# Patient Record
Sex: MALE | Race: BLACK OR AFRICAN AMERICAN
[De-identification: names, ages, dates, MRNs, and addresses within clinical notes are randomized per-mention and may not be internally consistent; named-entity substitution may affect disease eponyms.]

---

## 2017-02-21 ENCOUNTER — HOSPITAL ENCOUNTER (OUTPATIENT)
Dept: HOSPITAL 62 - END | Age: 77
Discharge: HOME | End: 2017-02-21
Attending: INTERNAL MEDICINE
Payer: MEDICARE

## 2017-02-21 VITALS — DIASTOLIC BLOOD PRESSURE: 51 MMHG | SYSTOLIC BLOOD PRESSURE: 134 MMHG

## 2017-02-21 DIAGNOSIS — Z79.899: ICD-10-CM

## 2017-02-21 DIAGNOSIS — R63.0: ICD-10-CM

## 2017-02-21 DIAGNOSIS — K59.01: ICD-10-CM

## 2017-02-21 DIAGNOSIS — D50.0: Primary | ICD-10-CM

## 2017-02-21 DIAGNOSIS — D64.9: ICD-10-CM

## 2017-02-21 DIAGNOSIS — K29.50: ICD-10-CM

## 2017-02-21 DIAGNOSIS — K92.1: ICD-10-CM

## 2017-02-21 DIAGNOSIS — J44.9: ICD-10-CM

## 2017-02-21 DIAGNOSIS — D12.5: ICD-10-CM

## 2017-02-21 DIAGNOSIS — Z99.3: ICD-10-CM

## 2017-02-21 DIAGNOSIS — D12.3: ICD-10-CM

## 2017-02-21 DIAGNOSIS — E78.00: ICD-10-CM

## 2017-02-21 DIAGNOSIS — R63.4: ICD-10-CM

## 2017-02-21 DIAGNOSIS — Z99.81: ICD-10-CM

## 2017-02-21 DIAGNOSIS — Z79.51: ICD-10-CM

## 2017-02-21 DIAGNOSIS — I10: ICD-10-CM

## 2017-02-21 DIAGNOSIS — D12.0: ICD-10-CM

## 2017-02-21 PROCEDURE — 0DBH8ZX EXCISION OF CECUM, VIA NATURAL OR ARTIFICIAL OPENING ENDOSCOPIC, DIAGNOSTIC: ICD-10-PCS | Performed by: INTERNAL MEDICINE

## 2017-02-21 PROCEDURE — 88304 TISSUE EXAM BY PATHOLOGIST: CPT

## 2017-02-21 PROCEDURE — 88342 IMHCHEM/IMCYTCHM 1ST ANTB: CPT

## 2017-02-21 PROCEDURE — 43239 EGD BIOPSY SINGLE/MULTIPLE: CPT

## 2017-02-21 PROCEDURE — 0DB68ZX EXCISION OF STOMACH, VIA NATURAL OR ARTIFICIAL OPENING ENDOSCOPIC, DIAGNOSTIC: ICD-10-PCS | Performed by: INTERNAL MEDICINE

## 2017-02-21 PROCEDURE — 45385 COLONOSCOPY W/LESION REMOVAL: CPT

## 2017-02-21 PROCEDURE — 0DBL8ZX EXCISION OF TRANSVERSE COLON, VIA NATURAL OR ARTIFICIAL OPENING ENDOSCOPIC, DIAGNOSTIC: ICD-10-PCS | Performed by: INTERNAL MEDICINE

## 2017-02-21 PROCEDURE — 0DBN8ZX EXCISION OF SIGMOID COLON, VIA NATURAL OR ARTIFICIAL OPENING ENDOSCOPIC, DIAGNOSTIC: ICD-10-PCS | Performed by: INTERNAL MEDICINE

## 2017-02-21 RX ADMIN — MIDAZOLAM HYDROCHLORIDE ONE MG: 1 INJECTION, SOLUTION INTRAMUSCULAR; INTRAVENOUS at 17:24

## 2017-02-21 RX ADMIN — MIDAZOLAM HYDROCHLORIDE ONE MG: 1 INJECTION, SOLUTION INTRAMUSCULAR; INTRAVENOUS at 17:40

## 2017-02-21 NOTE — PDOC DISCHARGE SUMMARY
Discharge Summary (SDC)





- Discharge


Final Diagnosis: 


Gastritis and multiple colon polyps


Date of Surgery: 02/21/17


Condition: Stable


Treatment or Instructions: 


No new medications


Discharge Diet: As Tolerated


Discharge Activity: Activity As Tolerated


Report the Following to Your Physician Immediately: Vomiting, Redness, Swelling

, Warmth

## 2017-02-21 NOTE — OPERATIVE REPORT
Operative Report


DATE OF SURGERY: 02/21/17


Operative Report: 


Pre-op diagnosis: Anemia





Post-op diagnosis:


1.  Antral gastritis


2.  Polyps in the cecum, transverse, and the sigmoid colon





Surgery: Upper endoscopy with biopsy , Colonoscopy with polypectomy 





Medications: Versed 2mg,   Fentanyl  100mcg IV push





Tissue removed: Antral biopsy and multiple colon polyps





Procedure: After informed consent obtained from patient, patient's pharynx was 

sprayed with Hurricane and conscious sedation was achieved.  The upper 

endoscope was then inserted into the esophagus under direct vision and advanced 

into the stomach and further into the duodenum.  Detailed examination of the 

duodenum, stomach and the esophagus was then performed. 


 A digital rectal examination was performed and this was unremarkable.  The 

colonoscope was inserted into the rectum and advanced to the cecum.  The 

appendiceal orifice and the terminal ileum were both identified.  The mucosa 

was examined into details as the colonoscope was slowly pulled out of the 

patient.  The endoscope was retroflexed in the rectum. Patient tolerated the 

procedure well.





Findings





Esophagus: Normal


Stomach: Mild to moderate erythema in the gastric antrum


Duodenum: Normal


Cecum: Two 4 mm polyps removed with the cold snare


Ascending colon: Normal


Transverse colon: 5 mm polyp removed with the cold snare


Descending colon: Normal


Sigmoid colon: Four 6-11 mm polyps removed with a hot snare


Rectum: Normal except for internal hemorrhoids





Plan: Await pathology.  Continue Nexium.  Repeat colonoscopy in three years


OPERATION: .

## 2017-02-28 ENCOUNTER — HOSPITAL ENCOUNTER (INPATIENT)
Dept: HOSPITAL 62 - ER | Age: 77
LOS: 4 days | Discharge: HOME | DRG: 394 | End: 2017-03-04
Attending: INTERNAL MEDICINE | Admitting: INTERNAL MEDICINE
Payer: MEDICARE

## 2017-02-28 DIAGNOSIS — M19.90: ICD-10-CM

## 2017-02-28 DIAGNOSIS — I48.2: ICD-10-CM

## 2017-02-28 DIAGNOSIS — D50.9: ICD-10-CM

## 2017-02-28 DIAGNOSIS — Z79.01: ICD-10-CM

## 2017-02-28 DIAGNOSIS — K44.9: ICD-10-CM

## 2017-02-28 DIAGNOSIS — I10: ICD-10-CM

## 2017-02-28 DIAGNOSIS — Z86.010: ICD-10-CM

## 2017-02-28 DIAGNOSIS — E46: ICD-10-CM

## 2017-02-28 DIAGNOSIS — I25.10: ICD-10-CM

## 2017-02-28 DIAGNOSIS — Z95.0: ICD-10-CM

## 2017-02-28 DIAGNOSIS — J44.9: ICD-10-CM

## 2017-02-28 DIAGNOSIS — E78.00: ICD-10-CM

## 2017-02-28 DIAGNOSIS — Z79.899: ICD-10-CM

## 2017-02-28 DIAGNOSIS — E86.1: ICD-10-CM

## 2017-02-28 DIAGNOSIS — K92.2: ICD-10-CM

## 2017-02-28 DIAGNOSIS — K63.3: Primary | ICD-10-CM

## 2017-02-28 DIAGNOSIS — Z87.891: ICD-10-CM

## 2017-02-28 LAB
ALBUMIN SERPL-MCNC: 3.1 G/DL (ref 3.5–5)
ALP SERPL-CCNC: 54 U/L (ref 38–126)
ALT SERPL-CCNC: 21 U/L (ref 21–72)
AMYLASE SERPL-CCNC: 89 U/L (ref 30–110)
ANION GAP SERPL CALC-SCNC: 8 MMOL/L (ref 5–19)
AST SERPL-CCNC: 12 U/L (ref 17–59)
BASOPHILS # BLD AUTO: 0 10^3/UL (ref 0–0.2)
BASOPHILS NFR BLD AUTO: 0.4 % (ref 0–2)
BILIRUB DIRECT SERPL-MCNC: 0 MG/DL (ref 0–0.3)
BILIRUB SERPL-MCNC: 0.3 MG/DL (ref 0.2–1.3)
BUN SERPL-MCNC: 25 MG/DL (ref 7–20)
CALCIUM: 9.5 MG/DL (ref 8.4–10.2)
CHLORIDE SERPL-SCNC: 100 MMOL/L (ref 98–107)
CK MB SERPL-MCNC: < 0.22 NG/ML (ref ?–4.55)
CO2 SERPL-SCNC: 31 MMOL/L (ref 22–30)
CREAT SERPL-MCNC: 0.79 MG/DL (ref 0.52–1.25)
EOSINOPHIL # BLD AUTO: 0 10^3/UL (ref 0–0.6)
EOSINOPHIL NFR BLD AUTO: 0.4 % (ref 0–6)
ERYTHROCYTE [DISTWIDTH] IN BLOOD BY AUTOMATED COUNT: 21.9 % (ref 11.5–14)
ETHANOL SERPL-MCNC: < 10 MG/DL
GLUCOSE SERPL-MCNC: 104 MG/DL (ref 75–110)
HCT VFR BLD CALC: 23.5 % (ref 37.9–51)
HGB BLD-MCNC: 7.6 G/DL (ref 13.5–17)
HGB HCT DIFFERENCE: -0.7
LIPASE SERPL-CCNC: 73.9 U/L (ref 23–300)
LYMPHOCYTES # BLD AUTO: 0.7 10^3/UL (ref 0.5–4.7)
LYMPHOCYTES NFR BLD AUTO: 11.6 % (ref 13–45)
MAGNESIUM SERPL-MCNC: 1.6 MG/DL (ref 1.6–2.3)
MCH RBC QN AUTO: 26.7 PG (ref 27–33.4)
MCHC RBC AUTO-ENTMCNC: 32.5 G/DL (ref 32–36)
MCV RBC AUTO: 82 FL (ref 80–97)
MONOCYTES # BLD AUTO: 0.5 10^3/UL (ref 0.1–1.4)
MONOCYTES NFR BLD AUTO: 7.3 % (ref 3–13)
NEUTROPHILS # BLD AUTO: 5.1 10^3/UL (ref 1.7–8.2)
NEUTS SEG NFR BLD AUTO: 80.3 % (ref 42–78)
PHOSPHATE SERPL-MCNC: 2.9 MG/DL (ref 2.5–4.5)
POTASSIUM SERPL-SCNC: 4.2 MMOL/L (ref 3.6–5)
PROT SERPL-MCNC: 5.3 G/DL (ref 6.3–8.2)
PROTHROMBIN TIME: 15.8 SEC (ref 11.4–15.4)
RBC # BLD AUTO: 2.86 10^6/UL (ref 4.35–5.55)
SODIUM SERPL-SCNC: 139.1 MMOL/L (ref 137–145)
TROPONIN I SERPL-MCNC: < 0.012 NG/ML
TSH SERPL-ACNC: 1.74 UIU/ML (ref 0.47–4.68)
WBC # BLD AUTO: 6.3 10^3/UL (ref 4–10.5)

## 2017-02-28 PROCEDURE — 85730 THROMBOPLASTIN TIME PARTIAL: CPT

## 2017-02-28 PROCEDURE — 86920 COMPATIBILITY TEST SPIN: CPT

## 2017-02-28 PROCEDURE — 93010 ELECTROCARDIOGRAM REPORT: CPT

## 2017-02-28 PROCEDURE — 84439 ASSAY OF FREE THYROXINE: CPT

## 2017-02-28 PROCEDURE — 85025 COMPLETE CBC W/AUTO DIFF WBC: CPT

## 2017-02-28 PROCEDURE — 36430 TRANSFUSION BLD/BLD COMPNT: CPT

## 2017-02-28 PROCEDURE — 86900 BLOOD TYPING SEROLOGIC ABO: CPT

## 2017-02-28 PROCEDURE — 86901 BLOOD TYPING SEROLOGIC RH(D): CPT

## 2017-02-28 PROCEDURE — 30233N1 TRANSFUSION OF NONAUTOLOGOUS RED BLOOD CELLS INTO PERIPHERAL VEIN, PERCUTANEOUS APPROACH: ICD-10-PCS | Performed by: INTERNAL MEDICINE

## 2017-02-28 PROCEDURE — 80061 LIPID PANEL: CPT

## 2017-02-28 PROCEDURE — 83735 ASSAY OF MAGNESIUM: CPT

## 2017-02-28 PROCEDURE — 83880 ASSAY OF NATRIURETIC PEPTIDE: CPT

## 2017-02-28 PROCEDURE — 85610 PROTHROMBIN TIME: CPT

## 2017-02-28 PROCEDURE — 87186 SC STD MICRODIL/AGAR DIL: CPT

## 2017-02-28 PROCEDURE — 82150 ASSAY OF AMYLASE: CPT

## 2017-02-28 PROCEDURE — 80053 COMPREHEN METABOLIC PANEL: CPT

## 2017-02-28 PROCEDURE — 86850 RBC ANTIBODY SCREEN: CPT

## 2017-02-28 PROCEDURE — 82550 ASSAY OF CK (CPK): CPT

## 2017-02-28 PROCEDURE — 80048 BASIC METABOLIC PNL TOTAL CA: CPT

## 2017-02-28 PROCEDURE — 83036 HEMOGLOBIN GLYCOSYLATED A1C: CPT

## 2017-02-28 PROCEDURE — 45382 COLONOSCOPY W/CONTROL BLEED: CPT

## 2017-02-28 PROCEDURE — 83690 ASSAY OF LIPASE: CPT

## 2017-02-28 PROCEDURE — 82553 CREATINE MB FRACTION: CPT

## 2017-02-28 PROCEDURE — 96360 HYDRATION IV INFUSION INIT: CPT

## 2017-02-28 PROCEDURE — 80307 DRUG TEST PRSMV CHEM ANLYZR: CPT

## 2017-02-28 PROCEDURE — 87040 BLOOD CULTURE FOR BACTERIA: CPT

## 2017-02-28 PROCEDURE — 85027 COMPLETE CBC AUTOMATED: CPT

## 2017-02-28 PROCEDURE — 36415 COLL VENOUS BLD VENIPUNCTURE: CPT

## 2017-02-28 PROCEDURE — 84443 ASSAY THYROID STIM HORMONE: CPT

## 2017-02-28 PROCEDURE — 84484 ASSAY OF TROPONIN QUANT: CPT

## 2017-02-28 PROCEDURE — 80076 HEPATIC FUNCTION PANEL: CPT

## 2017-02-28 PROCEDURE — P9016 RBC LEUKOCYTES REDUCED: HCPCS

## 2017-02-28 PROCEDURE — 99291 CRITICAL CARE FIRST HOUR: CPT

## 2017-02-28 PROCEDURE — 93005 ELECTROCARDIOGRAM TRACING: CPT

## 2017-02-28 PROCEDURE — 84100 ASSAY OF PHOSPHORUS: CPT

## 2017-02-28 PROCEDURE — 82140 ASSAY OF AMMONIA: CPT

## 2017-02-28 PROCEDURE — 45381 COLONOSCOPY SUBMUCOUS NJX: CPT

## 2017-02-28 PROCEDURE — 83605 ASSAY OF LACTIC ACID: CPT

## 2017-02-28 PROCEDURE — 87077 CULTURE AEROBIC IDENTIFY: CPT

## 2017-02-28 NOTE — ER DOCUMENT REPORT
ED GI Bleed / Rectal Pain





<DIANDRA MASSEY - Last Filed: 02/28/17 19:52>





- General


Mode of Arrival: Medic


Information source: Patient


TRAVEL OUTSIDE OF THE [place] IN LAST 30 DAYS: No





- HPI


Patient complains to provider of: Dark red bld from rectum


Onset: Yesterday


Timing/Duration: Sudden, Persistent


Recently seen / treated by doctor: Yes - colonoscopy 02/21/2017





<DEYA WILLIAM - Last Filed: 02/28/17 20:35>





- General


Chief Complaint: Rectal Bleeding


Stated Complaint: RECTAL BLEEDING


Notes: 


Patient is a 76-year-old male presenting to the emergency department concerned 

of rectal bleeding onset last night.  Patient thought he was having a bowel 

movement, but instead blood came out.  Patient describes it as very dark red.  

Patient denies any lightheadedness, but states that he becomes short of breath 

with walking.  Last Tuesday patient had a colonoscopy.  Polyps as well as 

gastritis were noted.  He received fluid bolus en route.  Patient was also 

continued on a blood thinner.


 (DEYA WILLIAM)





- Related Data


Allergies/Adverse Reactions: 


 





No Known Allergies Allergy (Verified 02/21/17 15:21)


 











Past Medical History





- General


Information source: Patient





- Social History


Smoking Status: Unknown if Ever Smoked


Family History: Reviewed & Not Pertinent





- Past Medical History


Cardiac Medical History: Reports: Hx Atrial Fibrillation - paroxysmal, Hx 

Coronary Artery Disease, Hx Hypercholesterolemia, Hx Hypertension


   Denies: Hx Heart Attack


Pulmonary Medical History: Reports: Hx Asthma, Hx Bronchitis, Hx COPD, Hx 

Pneumonia - june 2012, Hx Respiratory Failure


   Denies: Hx Tuberculosis


Neurological Medical History: Denies: Hx Cerebrovascular Accident, Hx Seizures


Renal/ Medical History: Reports: Hx Benign Prostatic Hyperplasia, Hx Kidney 

Stones


GI Medical History: Reports: Hx Hiatal Hernia


Musculoskeltal Medical History: Reports Hx Arthritis


Past Surgical History: Reports: Hx Pacemaker





- Immunizations


Hx Diphtheria, Pertussis, Tetanus Vaccination: Yes


Hx Pneumococcal Vaccination: 01/01/11





<DYEA WILLIAM - Last Filed: 02/28/17 20:35>





Review of Systems





- Review of Systems


Constitutional: No symptoms reported


EENT: No symptoms reported


Cardiovascular: No symptoms reported.  denies: Lightheaded


Respiratory: See HPI, Short of breath - With walking


Gastrointestinal: No symptoms reported


Genitourinary: No symptoms reported


Male Genitourinary: No symptoms reported


Musculoskeletal: No symptoms reported


Skin: No symptoms reported


Hematologic/Lymphatic: No symptoms reported


Neurological/Psychological: No symptoms reported


-: Yes All other systems reviewed and negative





<DEYA WILLIAM - Last Filed: 02/28/17 20:35>





Physical Exam





- Vital signs


Interpretation: Hypotensive, Tachycardic





- General


General appearance: Alert





- HEENT


Head: Normocephalic, Atraumatic


Eyes: Normal


Pupils: PERRL





- Respiratory


Respiratory status: No respiratory distress


Chest status: Nontender


Breath sounds: Normal


Chest palpation: Normal





- Cardiovascular


Rhythm: Regular


Heart sounds: Normal auscultation


Murmur: No





- Abdominal


Inspection: Normal


Distension: No distension


Bowel sounds: Normal


Tenderness: Nontender


Organomegaly: No organomegaly





- Rectal


Stool: Heme positive, Bloody


Hemorrhoids: External





- Back


Back: Normal, Nontender





- Extremities


General upper extremity: Normal inspection


General lower extremity: Normal inspection





- Neurological


Neuro grossly intact: Yes


Cognition: Normal


Orientation: AAOx4


[place] Coma Scale Eye Opening: Spontaneous


[place] Coma Scale Verbal: Oriented


[place] Coma Scale Motor: Obeys Commands


Ash[place] Coma Scale Total: 15


Speech: Normal





- Psychological


Associated symptoms: Normal affect, Normal mood





- Skin


Skin Temperature: Warm


Skin Moisture: Dry


Skin Color: Normal





<NATALIIADEYA - Last Filed: 02/28/17 20:35>





- Vital signs


Vitals: 


 











Resp Pulse Ox


 


 15   100 


 


 02/28/17 18:35  02/28/17 18:35














Course





- Laboratory


Result Diagrams: 


 02/28/17 19:02





 02/28/17 18:40





<DIANDRA MASSEY - Last Filed: 02/28/17 19:52>





- Laboratory


Result Diagrams: 


 02/28/17 19:02





 02/28/17 18:40





- Consults


  ** Dr. Vazquez


Time consulted: 19:38





  ** Dr. Longo


Time consulted: 19:50


Consulted provider: will see as inpatient





<DONNIE WILLIAMICA - Last Filed: 02/28/17 20:35>





- Vital Signs


Vital signs: 


 











Temp Pulse Resp BP Pulse Ox


 


       18   139/78 H  100 


 


       02/28/17 20:01  02/28/17 20:01  02/28/17 20:01














- Laboratory


Laboratory results interpreted by me: 


 











  02/28/17 02/28/17 02/28/17





  18:35 18:40 18:40


 


RBC   


 


Hgb   


 


Hct   


 


MCH   


 


RDW   


 


Seg Neutrophils %   


 


Lymphocytes %   


 


PT    15.8 H


 


Carbon Dioxide   31 H 


 


BUN   25 H 


 


AST   12 L 


 


Total Protein   5.3 L 


 


Albumin   3.1 L 


 


Acetaminophen   < 10 L 


 


Crossmatch  See Detail  














  02/28/17





  19:02


 


RBC  2.86 L


 


Hgb  7.6 L


 


Hct  23.5 L


 


MCH  26.7 L


 


RDW  21.9 H


 


Seg Neutrophils %  80.3 H


 


Lymphocytes %  11.6 L


 


PT 


 


Carbon Dioxide 


 


BUN 


 


AST 


 


Total Protein 


 


Albumin 


 


Acetaminophen 


 


Crossmatch 














- Consults


  ** Dr. Vazquez


Reason for consultation: 


02/28/17 19:38 Dr. Vazquez paged, awaiting return phone call. 








02/28/17 19:41 Dr. Vazquez returned call.  Discussed patient's case with him.

  Informed him that the patient is actively bleeding. I will check to see if GI 

is on call because the  stated that there was no GI doctor on call.


 (DEYA WILLIAM)





  ** Dr. Longo


Reason for consultation: 


02/28/17 20:23 discussed patient's case with Dr. Longo who wants an NG tube and 

go lightly.  Dr. Longo agrees to accept the patient.








02/28/17 20:35


 (DEYA WILLIAM)





Critical Care Note





- Critical Care Note


Total time excluding time spent on procedures (mins): 60





<DIANDRA MASSEY - Last Filed: 02/28/17 19:52>





Discharge





- Discharge


Admitting Provider: George


Unit Admitted: ICU





<DIANDRA MASSEY - Last Filed: 02/28/17 19:52>





<DEYA WILLIAM - Last Filed: 02/28/17 20:35>





- Discharge


Clinical Impression: 


 Acute lower GI bleeding, acute blood loss anemia with transfusion








Scribe Documentation





- Scribe


Written by Scribe:: Deya William 02/28/2017 0732


acting as scribe for :: [facility]





<DEYA WILLIAM - Last Filed: 02/28/17 20:35>

## 2017-03-01 LAB
ALBUMIN SERPL-MCNC: 3.1 G/DL (ref 3.5–5)
ALP SERPL-CCNC: 53 U/L (ref 38–126)
ALT SERPL-CCNC: 22 U/L (ref 21–72)
ANION GAP SERPL CALC-SCNC: 8 MMOL/L (ref 5–19)
AST SERPL-CCNC: 14 U/L (ref 17–59)
BASOPHILS # BLD AUTO: 0 10^3/UL (ref 0–0.2)
BASOPHILS # BLD AUTO: 0 10^3/UL (ref 0–0.2)
BASOPHILS NFR BLD AUTO: 0.3 % (ref 0–2)
BASOPHILS NFR BLD AUTO: 0.3 % (ref 0–2)
BILIRUB DIRECT SERPL-MCNC: 0 MG/DL (ref 0–0.3)
BILIRUB SERPL-MCNC: 0.6 MG/DL (ref 0.2–1.3)
BUN SERPL-MCNC: 21 MG/DL (ref 7–20)
CALCIUM: 9.1 MG/DL (ref 8.4–10.2)
CHLORIDE SERPL-SCNC: 104 MMOL/L (ref 98–107)
CHOLEST SERPL-MCNC: 120.02 MG/DL (ref 0–200)
CO2 SERPL-SCNC: 29 MMOL/L (ref 22–30)
CREAT SERPL-MCNC: 0.68 MG/DL (ref 0.52–1.25)
DIRECT HDL: 68 MG/DL (ref 40–?)
EOSINOPHIL # BLD AUTO: 0 10^3/UL (ref 0–0.6)
EOSINOPHIL # BLD AUTO: 0 10^3/UL (ref 0–0.6)
EOSINOPHIL NFR BLD AUTO: 0.2 % (ref 0–6)
EOSINOPHIL NFR BLD AUTO: 0.2 % (ref 0–6)
ERYTHROCYTE [DISTWIDTH] IN BLOOD BY AUTOMATED COUNT: 22 % (ref 11.5–14)
ERYTHROCYTE [DISTWIDTH] IN BLOOD BY AUTOMATED COUNT: 22.3 % (ref 11.5–14)
ERYTHROCYTE [DISTWIDTH] IN BLOOD BY AUTOMATED COUNT: 22.7 % (ref 11.5–14)
GLUCOSE SERPL-MCNC: 108 MG/DL (ref 75–110)
HCT VFR BLD CALC: 26.8 % (ref 37.9–51)
HCT VFR BLD CALC: 27.1 % (ref 37.9–51)
HCT VFR BLD CALC: 27.5 % (ref 37.9–51)
HGB BLD-MCNC: 8.9 G/DL (ref 13.5–17)
HGB BLD-MCNC: 9 G/DL (ref 13.5–17)
HGB BLD-MCNC: 9.1 G/DL (ref 13.5–17)
HGB HCT DIFFERENCE: -0.1
HGB HCT DIFFERENCE: -0.1
HGB HCT DIFFERENCE: -0.2
LDLC SERPL DIRECT ASSAY-MCNC: < 30 MG/DL (ref ?–100)
LYMPHOCYTES # BLD AUTO: 1.1 10^3/UL (ref 0.5–4.7)
LYMPHOCYTES # BLD AUTO: 1.2 10^3/UL (ref 0.5–4.7)
LYMPHOCYTES NFR BLD AUTO: 11.3 % (ref 13–45)
LYMPHOCYTES NFR BLD AUTO: 14.2 % (ref 13–45)
MCH RBC QN AUTO: 27.3 PG (ref 27–33.4)
MCH RBC QN AUTO: 27.4 PG (ref 27–33.4)
MCH RBC QN AUTO: 27.6 PG (ref 27–33.4)
MCHC RBC AUTO-ENTMCNC: 33 G/DL (ref 32–36)
MCHC RBC AUTO-ENTMCNC: 33.1 G/DL (ref 32–36)
MCHC RBC AUTO-ENTMCNC: 33.4 G/DL (ref 32–36)
MCV RBC AUTO: 82 FL (ref 80–97)
MCV RBC AUTO: 83 FL (ref 80–97)
MCV RBC AUTO: 84 FL (ref 80–97)
MONOCYTES # BLD AUTO: 0.5 10^3/UL (ref 0.1–1.4)
MONOCYTES # BLD AUTO: 0.7 10^3/UL (ref 0.1–1.4)
MONOCYTES NFR BLD AUTO: 6.1 % (ref 3–13)
MONOCYTES NFR BLD AUTO: 6.9 % (ref 3–13)
NEUTROPHILS # BLD AUTO: 6.7 10^3/UL (ref 1.7–8.2)
NEUTROPHILS # BLD AUTO: 8.2 10^3/UL (ref 1.7–8.2)
NEUTS SEG NFR BLD AUTO: 79.2 % (ref 42–78)
NEUTS SEG NFR BLD AUTO: 81.3 % (ref 42–78)
POTASSIUM SERPL-SCNC: 4.3 MMOL/L (ref 3.6–5)
PROT SERPL-MCNC: 5.2 G/DL (ref 6.3–8.2)
RBC # BLD AUTO: 3.25 10^6/UL (ref 4.35–5.55)
RBC # BLD AUTO: 3.29 10^6/UL (ref 4.35–5.55)
RBC # BLD AUTO: 3.29 10^6/UL (ref 4.35–5.55)
SODIUM SERPL-SCNC: 140.7 MMOL/L (ref 137–145)
TRIGL SERPL-MCNC: 94 MG/DL (ref ?–150)
VLDLC SERPL CALC-MCNC: 19 MG/DL (ref 10–31)
WBC # BLD AUTO: 10.1 10^3/UL (ref 4–10.5)
WBC # BLD AUTO: 7 10^3/UL (ref 4–10.5)
WBC # BLD AUTO: 8.5 10^3/UL (ref 4–10.5)

## 2017-03-01 PROCEDURE — 0W3P8ZZ CONTROL BLEEDING IN GASTROINTESTINAL TRACT, VIA NATURAL OR ARTIFICIAL OPENING ENDOSCOPIC: ICD-10-PCS | Performed by: INTERNAL MEDICINE

## 2017-03-01 RX ADMIN — FLUTICASONE PROPIONATE AND SALMETEROL SCH INH: 50; 250 POWDER RESPIRATORY (INHALATION) at 18:19

## 2017-03-01 RX ADMIN — SIMVASTATIN SCH MG: 40 TABLET, FILM COATED ORAL at 18:19

## 2017-03-01 RX ADMIN — MESALAMINE SCH MG: 400 CAPSULE, DELAYED RELEASE ORAL at 13:12

## 2017-03-01 RX ADMIN — MESALAMINE SCH MG: 400 CAPSULE, DELAYED RELEASE ORAL at 18:19

## 2017-03-01 RX ADMIN — MONTELUKAST SODIUM SCH MG: 10 TABLET, FILM COATED ORAL at 18:20

## 2017-03-01 RX ADMIN — TAMSULOSIN HYDROCHLORIDE SCH MG: 0.4 CAPSULE ORAL at 18:19

## 2017-03-01 NOTE — PDOC H&P
History of Present Illness


Admission Date/PCP: 


  02/28/17 19:56





  MIKE MCCULLOUGH MD





History of Present Illness: 


PEDRO REYES is a 76 year old male, history of chronic atrial fibrillation on 

chronic anticoagulation,he had colonoscopy on 02/21/2017, he polyps removed 

from the colon He came to emergency room with rectal bleeding, when he 

presented he was hypotensive with hemoglobin 7, he was treated in the emergency 

room with normal saline and subsequently receive 2 units of packed red blood 

cells.  He was seen by GI physician, He had  Emergency Colonoscopy and was 

found to have ulcer in the sigmoid colon that was actively bleeding.  The ulcer 

base was injected with epinephrine,the bleeding is presently controlled.





Past Medical History


Cardiac Medical History: Reports: Atrial Fibrillation - paroxysmal, Coronary 

Artery Disease, Hyperlipidema, Hypertension


Pulmonary Medical History: Reports: Asthma, Bronchitis, Chronic Obstructive 

Pulmonary Disease (COPD), Pneumonia - june 2012, Respiratory Failure


GI Medical History: Reports: Hiatal Hernia


Musculoskeltal Medical History: Reports: Arthritis


Hematology: Reports: Anemia - chronic





Past Surgical History


Past Surgical History: Reports: Pacemaker





Social History


Information Source: Patient


Smoking Status: Former Smoker


Frequency of Alcohol Use: None


Hx Recreational Drug Use: No


Hx Prescription Drug Abuse: No





- Advance Directive


Resuscitation Status: Full Code





Family History


Family History: Reviewed & Not Pertinent


Parental Family History Reviewed: Yes


Children Family History Reviewed: Yes


Sibling(s) Family History Reviewed.: Yes





Medication/Allergy


Home Medications: 








Dabigatran Etexilate Mesylate [Pradaxa 150 mg Capsule] 150 mg PO BID 03/01/17 


Diltiazem HCl [Diltiazem ER] 180 mg PO DAILY 03/01/17 


Docusate Sodium [Colace 100 mg Capsule] 100 mg PO BID 03/01/17 


Esomeprazole Magnesium [Nexium] 40 mg PO DAILY 03/01/17 


Finasteride [Proscar 5 mg Tablet] 5 mg PO DAILY 03/01/17 


Fluticasone/Salmeterol [Advair 250-50 Diskus 28 dose] 1 puff IH Q12 03/01/17 


Hydrochlorothiazide [Hydrodiuril 25 mg Tablet] 25 mg PO DAILY 03/01/17 


Megestrol Acetate 40 mg PO DAILY 03/01/17 


Montelukast Sodium [Singulair 10 mg Tablet] 10 mg PO QPM 03/01/17 


Roflumilast [Daliresp 500 mcg Tablet] 500 mg PO DAILY 03/01/17 


Sertraline HCl [Zoloft 50 mg Tablet] 50 mg PO DAILY 03/01/17 


Simvastatin [Zocor 40 mg Tablet] 40 mg PO QPM 03/01/17 


Tamsulosin HCl [Flomax 0.4 mg Cap.sr] 2 cap PO QHS 03/01/17 


Tiotropium Bromide [Spiriva Handihaler 18 mcg/dose (30 Dose)] 1 cap IH DAILY 03/ 01/17 








Allergies/Adverse Reactions: 


 





No Known Allergies Allergy (Verified 02/21/17 15:21)


 











Review of Systems


Constitutional: PRESENT: weight loss


Eyes: ABSENT: visual disturbances


Ears: ABSENT: hearing changes


Cardiovascular: ABSENT: chest pain, dyspnea on exertion, edema, orthropnea, 

palpitations


Respiratory: ABSENT: cough, hemoptysis


Gastrointestinal: PRESENT: hematochezia


Genitourinary: ABSENT: dysuria, hematuria


Musculoskeletal: ABSENT: joint swelling


Integumentary: ABSENT: rash, wounds


Neurological: ABSENT: abnormal gait, abnormal speech, confusion, dizziness, 

focal weakness, syncope


Psychiatric: ABSENT: anxiety, depression, homidical ideation, suicidal ideation


Endocrine: ABSENT: cold intolerance, heat intolerance, menstrual abnormalities, 

polydipsia, polyuria


Hematologic/Lymphatic: ABSENT: easy bleeding, easy bruising, lymphadenopathy





Physical Exam


Vital Signs: 


 











Temp Pulse Resp BP Pulse Ox


 


 98.1 F   96   20   110/65   94 


 


 03/01/17 12:00  03/01/17 12:12  03/01/17 12:00  03/01/17 12:00  03/01/17 12:00








 Intake & Output











 02/28/17 03/01/17 03/02/17





 06:59 06:59 06:59


 


Intake Total  1600 


 


Balance  1600 


 


Weight  75 kg 











General appearance: PRESENT: thin


Head exam: PRESENT: atraumatic, normocephalic


Eye exam: PRESENT: conjunctiva pink, EOMI, PERRLA


Mouth exam: PRESENT: moist


Neck exam: PRESENT: full ROM


Cardiovascular exam: PRESENT: RRR, +S1, +S2


Vascular exam: PRESENT: normal capillary refill


GI/Abdominal exam: PRESENT: normal bowel sounds, soft


Rectal exam: PRESENT: deferred


Neurological exam: PRESENT: alert, awake, oriented to person, oriented to place

, oriented to time, oriented to situation, CN II-XII grossly intact


Psychiatric exam: PRESENT: appropriate affect, normal mood


Skin exam: PRESENT: dry, intact, warm





Results


Laboratory Results: 


 





 03/01/17 07:14 





 03/01/17 07:14 





 











  02/28/17 03/01/17 03/01/17





  20:30 00:37 07:14


 


WBC   8.5  10.1


 


RBC   3.29 L  3.25 L


 


Hgb   9.1 L  8.9 L


 


Hct   27.5 L  26.8 L


 


MCV   84  83


 


MCH   27.6  27.3


 


MCHC   33.0  33.1


 


RDW   22.0 H  22.3 H


 


Plt Count   183  172


 


Seg Neutrophils %   79.2 H  81.3 H


 


Lymphocytes %   14.2  11.3 L


 


Monocytes %   6.1  6.9


 


Eosinophils %   0.2  0.2


 


Basophils %   0.3  0.3


 


Absolute Neutrophils   6.7  8.2


 


Absolute Lymphocytes   1.2  1.1


 


Absolute Monocytes   0.5  0.7


 


Absolute Eosinophils   0.0  0.0


 


Absolute Basophils   0.0  0.0


 


Sodium   


 


Potassium   


 


Chloride   


 


Carbon Dioxide   


 


Anion Gap   


 


BUN   


 


Creatinine   


 


Est GFR ( Amer)   


 


Est GFR (Non-Af Amer)   


 


Glucose   


 


Lactic Acid  1.7  


 


Calcium   


 


Total Bilirubin   


 


AST   


 


ALT   


 


Alkaline Phosphatase   


 


Ammonia   


 


Total Protein   


 


Albumin   


 


Triglycerides   


 


Cholesterol   


 


LDL Cholesterol Direct   


 


VLDL Cholesterol   


 


HDL Cholesterol   














  03/01/17 03/01/17





  07:14 07:14


 


WBC  


 


RBC  


 


Hgb  


 


Hct  


 


MCV  


 


MCH  


 


MCHC  


 


RDW  


 


Plt Count  


 


Seg Neutrophils %  


 


Lymphocytes %  


 


Monocytes %  


 


Eosinophils %  


 


Basophils %  


 


Absolute Neutrophils  


 


Absolute Lymphocytes  


 


Absolute Monocytes  


 


Absolute Eosinophils  


 


Absolute Basophils  


 


Sodium  140.7 


 


Potassium  4.3 


 


Chloride  104 


 


Carbon Dioxide  29 


 


Anion Gap  8 


 


BUN  21 H 


 


Creatinine  0.68 


 


Est GFR ( Amer)  > 60 


 


Est GFR (Non-Af Amer)  > 60 


 


Glucose  108 


 


Lactic Acid  


 


Calcium  9.1 


 


Total Bilirubin  0.6 


 


AST  14 L 


 


ALT  22 


 


Alkaline Phosphatase  53 


 


Ammonia   8.9 L


 


Total Protein  5.2 L 


 


Albumin  3.1 L 


 


Triglycerides  94 


 


Cholesterol  120.02 


 


LDL Cholesterol Direct  < 30 


 


VLDL Cholesterol  19.0 


 


HDL Cholesterol  68 








 











  03/01/17 03/01/17 03/01/17





  00:37 00:37 07:14


 


Creatine Kinase  29 L   43 L


 


Troponin I   < 0.012 














  03/01/17 03/01/17 03/01/17





  07:14 12:39 12:39


 


Creatine Kinase   41 L 


 


Troponin I  < 0.012   < 0.012














Assessment & Plan





- Diagnosis


(1) Acute lower gastrointestinal bleeding


Is this a current diagnosis for this admission?: Yes





(2) Chronic atrial fibrillation


Is this a current diagnosis for this admission?: YesPlan: 


Patient is on chronic anticoagulation with pradaxa, will hold on pradaxa at the 

moment 








(3) Chronic obstructive pulmonary disease


Qualifiers: 


     COPD type: unspecified COPD        Qualified Code(s): J44.9 - Chronic 

obstructive pulmonary disease, unspecified  


Is this a current diagnosis for this admission?: Yes





(4) Hypovolemia due to hemorrhage


Is this a current diagnosis for this admission?: YesPlan: 


He presented with hypotension on due GI bleed, status post blood transfusion 

presently hemodynamically stable.  He will be admitted into ICU for close 

monitoring

## 2017-03-01 NOTE — PDOC PROGRESS REPORT
Subjective


Progress Note for:: 03/01/17


Subjective:: 


Patient was seen by the bedside, still have  rectal bleed, probably will have 

another colonoscopy .  He is presently being prep for another colonoscopy





Physical Exam


Vital Signs: 


 











Temp Pulse Resp BP Pulse Ox


 


 98.1 F   96   21 H  127/62 H  96 


 


 03/01/17 12:00  03/01/17 12:12  03/01/17 14:35  03/01/17 14:35  03/01/17 14:35








 Intake & Output











 02/28/17 03/01/17 03/02/17





 06:59 06:59 06:59


 


Intake Total  1600 


 


Balance  1600 


 


Weight  75 kg 











General appearance: PRESENT: no acute distress, well-developed, well-nourished


Head exam: PRESENT: atraumatic, normocephalic


Eye exam: PRESENT: conjunctiva pink, EOMI, PERRLA


Neck exam: PRESENT: full ROM


Respiratory exam: PRESENT: clear to auscultation rivera


Cardiovascular exam: PRESENT: RRR, +S1, +S2


GI/Abdominal exam: PRESENT: normal bowel sounds, soft


Rectal exam: PRESENT: deferred


Neurological exam: PRESENT: alert, awake, oriented to person, oriented to place

, oriented to time, oriented to situation, CN II-XII grossly intact


Psychiatric exam: PRESENT: appropriate affect, normal mood


Skin exam: PRESENT: dry, intact, warm





Results


Laboratory Results: 


 





 03/01/17 07:14 





 03/01/17 07:14 





 











  02/28/17 03/01/17 03/01/17





  20:30 00:37 07:14


 


WBC   8.5  10.1


 


RBC   3.29 L  3.25 L


 


Hgb   9.1 L  8.9 L


 


Hct   27.5 L  26.8 L


 


MCV   84  83


 


MCH   27.6  27.3


 


MCHC   33.0  33.1


 


RDW   22.0 H  22.3 H


 


Plt Count   183  172


 


Seg Neutrophils %   79.2 H  81.3 H


 


Lymphocytes %   14.2  11.3 L


 


Monocytes %   6.1  6.9


 


Eosinophils %   0.2  0.2


 


Basophils %   0.3  0.3


 


Absolute Neutrophils   6.7  8.2


 


Absolute Lymphocytes   1.2  1.1


 


Absolute Monocytes   0.5  0.7


 


Absolute Eosinophils   0.0  0.0


 


Absolute Basophils   0.0  0.0


 


Sodium   


 


Potassium   


 


Chloride   


 


Carbon Dioxide   


 


Anion Gap   


 


BUN   


 


Creatinine   


 


Est GFR ( Amer)   


 


Est GFR (Non-Af Amer)   


 


Glucose   


 


Lactic Acid  1.7  


 


Calcium   


 


Total Bilirubin   


 


AST   


 


ALT   


 


Alkaline Phosphatase   


 


Ammonia   


 


Total Protein   


 


Albumin   


 


Triglycerides   


 


Cholesterol   


 


LDL Cholesterol Direct   


 


VLDL Cholesterol   


 


HDL Cholesterol   














  03/01/17 03/01/17





  07:14 07:14


 


WBC  


 


RBC  


 


Hgb  


 


Hct  


 


MCV  


 


MCH  


 


MCHC  


 


RDW  


 


Plt Count  


 


Seg Neutrophils %  


 


Lymphocytes %  


 


Monocytes %  


 


Eosinophils %  


 


Basophils %  


 


Absolute Neutrophils  


 


Absolute Lymphocytes  


 


Absolute Monocytes  


 


Absolute Eosinophils  


 


Absolute Basophils  


 


Sodium  140.7 


 


Potassium  4.3 


 


Chloride  104 


 


Carbon Dioxide  29 


 


Anion Gap  8 


 


BUN  21 H 


 


Creatinine  0.68 


 


Est GFR ( Amer)  > 60 


 


Est GFR (Non-Af Amer)  > 60 


 


Glucose  108 


 


Lactic Acid  


 


Calcium  9.1 


 


Total Bilirubin  0.6 


 


AST  14 L 


 


ALT  22 


 


Alkaline Phosphatase  53 


 


Ammonia   8.9 L


 


Total Protein  5.2 L 


 


Albumin  3.1 L 


 


Triglycerides  94 


 


Cholesterol  120.02 


 


LDL Cholesterol Direct  < 30 


 


VLDL Cholesterol  19.0 


 


HDL Cholesterol  68 








 











  03/01/17 03/01/17 03/01/17





  00:37 00:37 07:14


 


Creatine Kinase  29 L   43 L


 


Troponin I   < 0.012 














  03/01/17 03/01/17 03/01/17





  07:14 12:39 12:39


 


Creatine Kinase   41 L 


 


Troponin I  < 0.012   < 0.012














Assessment & Plan





- Diagnosis


(1) Acute lower gastrointestinal bleeding


Is this a current diagnosis for this admission?: Yes





(2) Chronic atrial fibrillation


Is this a current diagnosis for this admission?: Yes





(3) Chronic obstructive pulmonary disease


Qualifiers: 


     COPD type: unspecified COPD        Qualified Code(s): J44.9 - Chronic 

obstructive pulmonary disease, unspecified  


Is this a current diagnosis for this admission?: Yes





(4) Hypovolemia due to hemorrhage


Is this a current diagnosis for this admission?: Yes

## 2017-03-01 NOTE — OPERATIVE REPORT E
Operative Report



NAME: PEDRO REYES

MRN:  [id number]          : 1940 AGE:  76Y

DATE OF SURGERY: 2017              ROOM: ED08



PREOPERATIVE DIAGNOSIS:

Rectal bleeding.



POSTOPERATIVE DIAGNOSES:

1.   Post polypectomy.

2.   Ulcer with active bleeding in the sigmoid colon.



PROCEDURE PERFORMED:

Colonoscopy with *------* bleeding.



SURGEON:

CYN MCFARLANE M.D.



ANESTHESIA:

Versed 2 mg and Fentanyl 50 mcg IV push.



TISSUE REMOVED OR ALTERED:

None.



DESCRIPTION OF PROCEDURE:

After informed consent obtained from patient, conscious sedation was

achieved.  The colonoscope was inserted into the rectum.  There was fresh

blood noted over the left colon and very dark stool with *------* blood in

the right colon.  Active bleeding with a fresh clot was noted over an

ulcer in the sigmoid colon and about 30 cm.  The base of the ulcer was

injected with epinephrine, mixed half and half with normal saline.  The

polypectomy site was then clipped with 2 Endo clips.  There was no active

bleeding at the end of the procedure.  I did not see any other bleeding

sites in the colon.  View of the colon was very limited on the right side

due to stool.  He tolerated the procedure well.



PLAN:

Continue to follow H and H.  I will give him GoLYTELY to clean out his

colon in case his procedure needs to be repeated.  I will suggest we hold

anticoagulant for a few days.







DICTATING PHYSICIAN:  CYN MCFARLANE M.D.





[id number]                  DT: 2017    0126

PHY#: [id number]            DD: 2017    0009

ID:   [id number]           JOB#: [id number]       ACCT: [id number]



cc:JORGITO ODEN M.D.

>

## 2017-03-01 NOTE — EKG REPORT
SEVERITY:- ABNORMAL ECG -

SINUS TACHYCARDIA

ANTERIOR INFARCT, AGE INDETERMINATE

:

Confirmed by: Cheo Sultana MD 01-Mar-2017 08:29:07

## 2017-03-01 NOTE — CONSULTATION REPORT E
Consultation Report



NAME: PEDRO REYES

MRN:  [id number]               : 1940      AGE: 76Y

DATE: 2017    ED08  A



TO:   CYN MCFARLANE M.D.



FROM: MIKE MCCULLOUGH M.D.

      Requesting Physician



HISTORY OF PRESENT ILLNESS:

A 76-year-old patient who came into the hospital with rectal bleeding.  He

had a colonoscopy on 17 with removal of polyps from the cecum,

transverse colon, and the sigmoid colon.  He started his Pradaxa about

24-36 hours afterwards. He noticed a little bit of bleeding last night but

it got worse today prior to presenting to the emergency room.  He denies

abdominal pain.  On admission, he was hypotensive and required 2 L of

fluid.  He has since received 2 units of blood and his heart rate remains

at 106 with a blood pressure of 120 systolic.



PAST MEDICAL HISTORY:

1.   COPD.

2.   Hypertension.

3.   Hypercholesterolemia.

4.   Iron deficiency anemia.

5.   Colon polyps.

 

PAST SURGICAL HISTORY:

1.   EGD.

2.   Colonoscopy in  and again a week ago.



ALLERGIES:

None.



SOCIAL HISTORY:

Noncontributory.  He is accompanied by his daughter.



REVIEW OF SYSTEMS:

Noncontributory.



PHYSICAL EXAMINATION:

GENERAL:  The patient is in no distress.

HEENT:  There is pallor but no jaundice.  Oropharynx normal.

NECK:  No bruit.  No JVD.

CHEST:  No deformity.

LUNGS:  Clear.  S1, S2 normal without murmurs.

ABDOMEN:  Soft and nontender.  No masses felt.



LABORATORY DATA:

Hemoglobin 7.6 upon arrival in the emergency room.  His Chem-7 was normal

and LFTs were also normal except for a BUN of 25.



ASSESSMENT AND PLAN:

Acute GI bleeding.  Patient is almost certainly bleeding from one of his

polypectomy sites.  He had 2 polyps removed from the cecum, 1 from the

transverse colon with a cold snare, and 4 from the sigmoid colon with the

hot snare.  He will undergo *------* colonoscopy.











 





DICTATING PHYSICIAN: CYN MCFARLANE M.D.





[id number]                  DT: 2017    0239

PHY#: [id number]            DD: 2017    0006

ID:   [id number]           JOB#: [id number]      ACCT: [id number]



cc:MIKE MCCULLOUGH M.D.

   CYN MCFARLANE M.D.

>

## 2017-03-02 LAB
ALBUMIN SERPL-MCNC: 2.6 G/DL (ref 3.5–5)
ALP SERPL-CCNC: 47 U/L (ref 38–126)
ALT SERPL-CCNC: 27 U/L (ref 21–72)
ANION GAP SERPL CALC-SCNC: 6 MMOL/L (ref 5–19)
AST SERPL-CCNC: 12 U/L (ref 17–59)
BASOPHILS # BLD AUTO: 0 10^3/UL (ref 0–0.2)
BASOPHILS # BLD AUTO: 0 10^3/UL (ref 0–0.2)
BASOPHILS NFR BLD AUTO: 0.3 % (ref 0–2)
BASOPHILS NFR BLD AUTO: 0.5 % (ref 0–2)
BILIRUB DIRECT SERPL-MCNC: 0 MG/DL (ref 0–0.3)
BILIRUB SERPL-MCNC: 0.4 MG/DL (ref 0.2–1.3)
BUN SERPL-MCNC: 10 MG/DL (ref 7–20)
CALCIUM: 9.1 MG/DL (ref 8.4–10.2)
CHLORIDE SERPL-SCNC: 102 MMOL/L (ref 98–107)
CO2 SERPL-SCNC: 30 MMOL/L (ref 22–30)
CREAT SERPL-MCNC: 0.75 MG/DL (ref 0.52–1.25)
EOSINOPHIL # BLD AUTO: 0 10^3/UL (ref 0–0.6)
EOSINOPHIL # BLD AUTO: 0 10^3/UL (ref 0–0.6)
EOSINOPHIL NFR BLD AUTO: 0.6 % (ref 0–6)
EOSINOPHIL NFR BLD AUTO: 0.8 % (ref 0–6)
ERYTHROCYTE [DISTWIDTH] IN BLOOD BY AUTOMATED COUNT: 20 % (ref 11.5–14)
ERYTHROCYTE [DISTWIDTH] IN BLOOD BY AUTOMATED COUNT: 22.2 % (ref 11.5–14)
GLUCOSE SERPL-MCNC: 85 MG/DL (ref 75–110)
HCT VFR BLD CALC: 22 % (ref 37.9–51)
HCT VFR BLD CALC: 30.7 % (ref 37.9–51)
HGB BLD-MCNC: 10.5 G/DL (ref 13.5–17)
HGB BLD-MCNC: 7.5 G/DL (ref 13.5–17)
HGB HCT DIFFERENCE: 0.5
HGB HCT DIFFERENCE: 0.8
LYMPHOCYTES # BLD AUTO: 0.8 10^3/UL (ref 0.5–4.7)
LYMPHOCYTES # BLD AUTO: 1 10^3/UL (ref 0.5–4.7)
LYMPHOCYTES NFR BLD AUTO: 13.9 % (ref 13–45)
LYMPHOCYTES NFR BLD AUTO: 17.8 % (ref 13–45)
MCH RBC QN AUTO: 27.5 PG (ref 27–33.4)
MCH RBC QN AUTO: 28 PG (ref 27–33.4)
MCHC RBC AUTO-ENTMCNC: 33.9 G/DL (ref 32–36)
MCHC RBC AUTO-ENTMCNC: 34.1 G/DL (ref 32–36)
MCV RBC AUTO: 81 FL (ref 80–97)
MCV RBC AUTO: 82 FL (ref 80–97)
MONOCYTES # BLD AUTO: 0.5 10^3/UL (ref 0.1–1.4)
MONOCYTES # BLD AUTO: 0.5 10^3/UL (ref 0.1–1.4)
MONOCYTES NFR BLD AUTO: 8.7 % (ref 3–13)
MONOCYTES NFR BLD AUTO: 9 % (ref 3–13)
NEUTROPHILS # BLD AUTO: 3.9 10^3/UL (ref 1.7–8.2)
NEUTROPHILS # BLD AUTO: 4.4 10^3/UL (ref 1.7–8.2)
NEUTS SEG NFR BLD AUTO: 71.9 % (ref 42–78)
NEUTS SEG NFR BLD AUTO: 76.5 % (ref 42–78)
POTASSIUM SERPL-SCNC: 3.6 MMOL/L (ref 3.6–5)
PROT SERPL-MCNC: 4.4 G/DL (ref 6.3–8.2)
RBC # BLD AUTO: 2.72 10^6/UL (ref 4.35–5.55)
RBC # BLD AUTO: 3.75 10^6/UL (ref 4.35–5.55)
SODIUM SERPL-SCNC: 137.8 MMOL/L (ref 137–145)
WBC # BLD AUTO: 5.4 10^3/UL (ref 4–10.5)
WBC # BLD AUTO: 5.7 10^3/UL (ref 4–10.5)

## 2017-03-02 PROCEDURE — 30233N1 TRANSFUSION OF NONAUTOLOGOUS RED BLOOD CELLS INTO PERIPHERAL VEIN, PERCUTANEOUS APPROACH: ICD-10-PCS | Performed by: INTERNAL MEDICINE

## 2017-03-02 RX ADMIN — ROFLUMILAST SCH MCG: 500 TABLET ORAL at 12:26

## 2017-03-02 RX ADMIN — TIOTROPIUM BROMIDE SCH CAP: 18 CAPSULE ORAL; RESPIRATORY (INHALATION) at 12:27

## 2017-03-02 RX ADMIN — MESALAMINE SCH MG: 400 CAPSULE, DELAYED RELEASE ORAL at 16:01

## 2017-03-02 RX ADMIN — FINASTERIDE SCH MG: 5 TABLET, FILM COATED ORAL at 12:25

## 2017-03-02 RX ADMIN — MONTELUKAST SODIUM SCH MG: 10 TABLET, FILM COATED ORAL at 19:13

## 2017-03-02 RX ADMIN — SERTRALINE HYDROCHLORIDE SCH MG: 50 TABLET ORAL at 12:27

## 2017-03-02 RX ADMIN — MESALAMINE SCH MG: 400 CAPSULE, DELAYED RELEASE ORAL at 12:26

## 2017-03-02 RX ADMIN — LEVOFLOXACIN SCH ML: 750 INJECTION, SOLUTION INTRAVENOUS at 16:02

## 2017-03-02 RX ADMIN — FLUTICASONE PROPIONATE AND SALMETEROL SCH INH: 50; 250 POWDER RESPIRATORY (INHALATION) at 19:14

## 2017-03-02 RX ADMIN — DILTIAZEM HYDROCHLORIDE SCH MG: 180 CAPSULE, EXTENDED RELEASE ORAL at 12:23

## 2017-03-02 RX ADMIN — MESALAMINE SCH MG: 400 CAPSULE, DELAYED RELEASE ORAL at 19:14

## 2017-03-02 RX ADMIN — FLUTICASONE PROPIONATE AND SALMETEROL SCH INH: 50; 250 POWDER RESPIRATORY (INHALATION) at 12:27

## 2017-03-02 RX ADMIN — SIMVASTATIN SCH MG: 40 TABLET, FILM COATED ORAL at 19:13

## 2017-03-02 RX ADMIN — LANSOPRAZOLE SCH MG: 30 TABLET, ORALLY DISINTEGRATING, DELAYED RELEASE ORAL at 12:26

## 2017-03-02 RX ADMIN — TAMSULOSIN HYDROCHLORIDE SCH MG: 0.4 CAPSULE ORAL at 19:13

## 2017-03-02 NOTE — PDOC PROGRESS REPORT
Subjective


Progress Note for:: 03/02/17


Subjective:: 


Patient was seen by the bedside, he is still bleeding, hemoglobin was 7 this 

morning, he was transfused with 2 units of packed red pulses and 

posttransfusion hemoglobin is 10





Physical Exam


Vital Signs: 


 











Temp Pulse Resp BP Pulse Ox


 


 98.9 F   84   28 H  108/54 L  99 


 


 03/02/17 18:00  03/02/17 15:09  03/02/17 15:09  03/02/17 15:09  03/02/17 15:09








 Intake & Output











 03/01/17 03/02/17 03/03/17





 06:59 06:59 06:59


 


Intake Total 1600 200 600


 


Output Total  1700 880


 


Balance 1600 -1500 -280


 


Weight 75 kg 66.2 kg 











General appearance: PRESENT: mild distress


Eye exam: PRESENT: PERRLA


Respiratory exam: PRESENT: decreased breath sounds


Cardiovascular exam: PRESENT: +S1, +S2


GI/Abdominal exam: PRESENT: soft


Neurological exam: PRESENT: alert, CN II-XII grossly intact





Results


Laboratory Results: 


 





 03/02/17 17:23 





 03/02/17 04:20 





 











  03/02/17 03/02/17 03/02/17





  04:20 04:20 04:20


 


WBC  5.4  


 


RBC  2.72 L  


 


Hgb  7.5 L  


 


Hct  22.0 L  


 


MCV  81  


 


MCH  27.5  


 


MCHC  33.9  


 


RDW  22.2 H  


 


Plt Count  136 L  


 


Seg Neutrophils %  71.9  


 


Lymphocytes %  17.8  


 


Monocytes %  9.0  


 


Eosinophils %  0.8  


 


Basophils %  0.5  


 


Absolute Neutrophils  3.9  


 


Absolute Lymphocytes  1.0  


 


Absolute Monocytes  0.5  


 


Absolute Eosinophils  0.0  


 


Absolute Basophils  0.0  


 


Sodium   137.8 


 


Potassium   3.6 


 


Chloride   102 


 


Carbon Dioxide   30 


 


Anion Gap   6 


 


BUN   10 


 


Creatinine   0.75 


 


Est GFR ( Amer)   > 60 


 


Est GFR (Non-Af Amer)   > 60 


 


Glucose   85 


 


Calcium   9.1 


 


Total Bilirubin   0.4 


 


AST   12 L 


 


ALT   27 


 


Alkaline Phosphatase   47 


 


Ammonia    < 8.7 L


 


Total Protein   4.4 L 


 


Albumin   2.6 L 














  03/02/17





  17:23


 


WBC  5.7


 


RBC  3.75 L


 


Hgb  10.5 L D


 


Hct  30.7 L


 


MCV  82


 


MCH  28.0


 


MCHC  34.1


 


RDW  20.0 H


 


Plt Count  140 L


 


Seg Neutrophils %  76.5


 


Lymphocytes %  13.9


 


Monocytes %  8.7


 


Eosinophils %  0.6


 


Basophils %  0.3


 


Absolute Neutrophils  4.4


 


Absolute Lymphocytes  0.8


 


Absolute Monocytes  0.5


 


Absolute Eosinophils  0.0


 


Absolute Basophils  0.0


 


Sodium 


 


Potassium 


 


Chloride 


 


Carbon Dioxide 


 


Anion Gap 


 


BUN 


 


Creatinine 


 


Est GFR ( Amer) 


 


Est GFR (Non-Af Amer) 


 


Glucose 


 


Calcium 


 


Total Bilirubin 


 


AST 


 


ALT 


 


Alkaline Phosphatase 


 


Ammonia 


 


Total Protein 


 


Albumin 








 











  03/01/17 03/01/17 03/01/17





  00:37 00:37 07:14


 


Creatine Kinase  29 L   43 L


 


Troponin I   < 0.012 














  03/01/17 03/01/17 03/01/17





  07:14 12:39 12:39


 


Creatine Kinase   41 L 


 


Troponin I  < 0.012   < 0.012














Assessment & Plan





- Diagnosis


(1) Acute lower gastrointestinal bleeding


Is this a current diagnosis for this admission?: YesPlan: 


Patient still have ongoing GI bleed, transfused 2 units of packed red blood 

cells








(2) Chronic atrial fibrillation


Is this a current diagnosis for this admission?: Yes





(3) Chronic obstructive pulmonary disease


Qualifiers: 


     COPD type: unspecified COPD        Qualified Code(s): J44.9 - Chronic 

obstructive pulmonary disease, unspecified  


Is this a current diagnosis for this admission?: Yes





(4) Hypovolemia due to hemorrhage


Is this a current diagnosis for this admission?: YesPlan: 


Patient is hemodynamically stable at this time, he will be downgraded to 

telemetry bed

## 2017-03-02 NOTE — PHYSICIAN ADVISORY NOTE
Physician Advisor ProgressNote


.: 


Pursuant to the  plan for [facility], I have reviewed the medical record 

for this patient.  





Physician Advisor Statement: 





Possible documentation opportunities if attending agrees:


1.  "Acute Blood Loss Anemia due to LGIBleeding, due to _____"


2.  "underweight with protein-calorie malnutrition [state mild, mod, or severe] 

with BMI  __, ____[?wt loss, ?appetite loss, ]" 


 [if possible, give specifics on intake, wt loss, loss of SQ fat & muscle mass, 

diminished hand  strength,    & clinical importance such as (A) 

nutritional assessment ordered, (B) modified diet or supplements ordered,  (C) 

additional labs ordered, (D) prolonged wound healing time, (E) delayed infxn 

clearance]  - - -    Auditors are strict about the dx of malnutrition - needs 

to be explicitly spelled out.








As always, if concerned about any unstable VS or abnormal labs, please comment 

on them & note what doing about them, &


       please document each day the potential clinical problems you are 

concerned could occur if pt not kept in hospital for tx at this time.





Thanks for your help with documentation accuracy/specificity improvement!





Sallie Brown MD


[facility] Physician Advisor, Fellow of Hospital Medicine

## 2017-03-03 LAB
ALBUMIN SERPL-MCNC: 2.8 G/DL (ref 3.5–5)
ALP SERPL-CCNC: 47 U/L (ref 38–126)
ALT SERPL-CCNC: 26 U/L (ref 21–72)
ANION GAP SERPL CALC-SCNC: 10 MMOL/L (ref 5–19)
AST SERPL-CCNC: 15 U/L (ref 17–59)
BASOPHILS # BLD AUTO: 0 10^3/UL (ref 0–0.2)
BASOPHILS # BLD AUTO: 0 10^3/UL (ref 0–0.2)
BASOPHILS NFR BLD AUTO: 0.4 % (ref 0–2)
BASOPHILS NFR BLD AUTO: 0.4 % (ref 0–2)
BILIRUB DIRECT SERPL-MCNC: 0 MG/DL (ref 0–0.3)
BILIRUB SERPL-MCNC: 0.9 MG/DL (ref 0.2–1.3)
BUN SERPL-MCNC: 6 MG/DL (ref 7–20)
CALCIUM: 9.3 MG/DL (ref 8.4–10.2)
CHLORIDE SERPL-SCNC: 101 MMOL/L (ref 98–107)
CO2 SERPL-SCNC: 27 MMOL/L (ref 22–30)
CREAT SERPL-MCNC: 0.7 MG/DL (ref 0.52–1.25)
EOSINOPHIL # BLD AUTO: 0 10^3/UL (ref 0–0.6)
EOSINOPHIL # BLD AUTO: 0 10^3/UL (ref 0–0.6)
EOSINOPHIL NFR BLD AUTO: 0.5 % (ref 0–6)
EOSINOPHIL NFR BLD AUTO: 0.7 % (ref 0–6)
ERYTHROCYTE [DISTWIDTH] IN BLOOD BY AUTOMATED COUNT: 19.8 % (ref 11.5–14)
ERYTHROCYTE [DISTWIDTH] IN BLOOD BY AUTOMATED COUNT: 19.8 % (ref 11.5–14)
GLUCOSE SERPL-MCNC: 83 MG/DL (ref 75–110)
HCT VFR BLD CALC: 28.8 % (ref 37.9–51)
HCT VFR BLD CALC: 29.8 % (ref 37.9–51)
HGB BLD-MCNC: 10 G/DL (ref 13.5–17)
HGB BLD-MCNC: 10.1 G/DL (ref 13.5–17)
HGB HCT DIFFERENCE: 0.5
HGB HCT DIFFERENCE: 1.2
LYMPHOCYTES # BLD AUTO: 0.6 10^3/UL (ref 0.5–4.7)
LYMPHOCYTES # BLD AUTO: 0.7 10^3/UL (ref 0.5–4.7)
LYMPHOCYTES NFR BLD AUTO: 11.6 % (ref 13–45)
LYMPHOCYTES NFR BLD AUTO: 13 % (ref 13–45)
MCH RBC QN AUTO: 27.8 PG (ref 27–33.4)
MCH RBC QN AUTO: 28.3 PG (ref 27–33.4)
MCHC RBC AUTO-ENTMCNC: 33.9 G/DL (ref 32–36)
MCHC RBC AUTO-ENTMCNC: 34.8 G/DL (ref 32–36)
MCV RBC AUTO: 81 FL (ref 80–97)
MCV RBC AUTO: 82 FL (ref 80–97)
MONOCYTES # BLD AUTO: 0.5 10^3/UL (ref 0.1–1.4)
MONOCYTES # BLD AUTO: 0.5 10^3/UL (ref 0.1–1.4)
MONOCYTES NFR BLD AUTO: 9.1 % (ref 3–13)
MONOCYTES NFR BLD AUTO: 9.6 % (ref 3–13)
NEUTROPHILS # BLD AUTO: 4 10^3/UL (ref 1.7–8.2)
NEUTROPHILS # BLD AUTO: 4.1 10^3/UL (ref 1.7–8.2)
NEUTS SEG NFR BLD AUTO: 77 % (ref 42–78)
NEUTS SEG NFR BLD AUTO: 77.7 % (ref 42–78)
POTASSIUM SERPL-SCNC: 3.4 MMOL/L (ref 3.6–5)
PROT SERPL-MCNC: 4.8 G/DL (ref 6.3–8.2)
RBC # BLD AUTO: 3.54 10^6/UL (ref 4.35–5.55)
RBC # BLD AUTO: 3.63 10^6/UL (ref 4.35–5.55)
SODIUM SERPL-SCNC: 137.5 MMOL/L (ref 137–145)
WBC # BLD AUTO: 5.2 10^3/UL (ref 4–10.5)
WBC # BLD AUTO: 5.3 10^3/UL (ref 4–10.5)

## 2017-03-03 RX ADMIN — DILTIAZEM HYDROCHLORIDE SCH MG: 180 CAPSULE, EXTENDED RELEASE ORAL at 10:00

## 2017-03-03 RX ADMIN — FINASTERIDE SCH MG: 5 TABLET, FILM COATED ORAL at 09:59

## 2017-03-03 RX ADMIN — LEVOFLOXACIN SCH ML: 750 INJECTION, SOLUTION INTRAVENOUS at 13:15

## 2017-03-03 RX ADMIN — FLUTICASONE PROPIONATE AND SALMETEROL SCH INH: 50; 250 POWDER RESPIRATORY (INHALATION) at 17:50

## 2017-03-03 RX ADMIN — ROFLUMILAST SCH MCG: 500 TABLET ORAL at 10:00

## 2017-03-03 RX ADMIN — FLUTICASONE PROPIONATE AND SALMETEROL SCH INH: 50; 250 POWDER RESPIRATORY (INHALATION) at 10:01

## 2017-03-03 RX ADMIN — MESALAMINE SCH MG: 400 CAPSULE, DELAYED RELEASE ORAL at 17:51

## 2017-03-03 RX ADMIN — TAMSULOSIN HYDROCHLORIDE SCH MG: 0.4 CAPSULE ORAL at 17:50

## 2017-03-03 RX ADMIN — TIOTROPIUM BROMIDE SCH CAP: 18 CAPSULE ORAL; RESPIRATORY (INHALATION) at 10:00

## 2017-03-03 RX ADMIN — MONTELUKAST SODIUM SCH MG: 10 TABLET, FILM COATED ORAL at 17:50

## 2017-03-03 RX ADMIN — LANSOPRAZOLE SCH MG: 30 TABLET, ORALLY DISINTEGRATING, DELAYED RELEASE ORAL at 09:59

## 2017-03-03 RX ADMIN — SIMVASTATIN SCH MG: 40 TABLET, FILM COATED ORAL at 17:51

## 2017-03-03 RX ADMIN — MESALAMINE SCH MG: 400 CAPSULE, DELAYED RELEASE ORAL at 09:59

## 2017-03-03 RX ADMIN — MESALAMINE SCH MG: 400 CAPSULE, DELAYED RELEASE ORAL at 15:34

## 2017-03-03 RX ADMIN — SERTRALINE HYDROCHLORIDE SCH MG: 50 TABLET ORAL at 10:00

## 2017-03-03 NOTE — PDOC DISCHARGE SUMMARY
General





- Admit/Disc Date/PCP


Admission Date/Primary Care Provider: 


  02/28/17 19:56





  MIKE MCCULLOUGH MD





Discharge Date: 03/03/17





- Discharge Diagnosis


(1) Acute lower gastrointestinal bleeding


Is this a current diagnosis for this admission?: YesSummary: 


Acute lower GI bleed due to bleeding ulcer in the sigmoid colon








(2) Chronic atrial fibrillation


Is this a current diagnosis for this admission?: Yes





(3) Chronic obstructive pulmonary disease


Is this a current diagnosis for this admission?: Yes





(4) Hypovolemia due to hemorrhage


Is this a current diagnosis for this admission?: Yes





(5) Protein-calorie undernutrition


Is this a current diagnosis for this admission?: YesSummary: 


He has a body mass index of 17, the weight loss has been extensively evaluated 

outpatient, the weight loss is thought to be due to combination of multiple 

comorbid conditions including very severe COPD and  poor intake











- Additional Information


Resuscitation Status: Full Code


Discharge Diet: As Tolerated


Discharge Activity: Activity As Tolerated


Home Medications: 








Dabigatran Etexilate Mesylate [Pradaxa 150 mg Capsule] 150 mg PO BID 03/01/17 


Diltiazem HCl [Diltiazem ER] 180 mg PO DAILY 03/01/17 


Docusate Sodium [Colace 100 mg Capsule] 100 mg PO BID 03/01/17 


Esomeprazole Magnesium [Nexium] 40 mg PO DAILY 03/01/17 


Finasteride [Proscar 5 mg Tablet] 5 mg PO DAILY 03/01/17 


Fluticasone/Salmeterol [Advair 250-50 Diskus 28 dose] 1 puff IH Q12 03/01/17 


Hydrochlorothiazide [Hydrodiuril 25 mg Tablet] 25 mg PO DAILY 03/01/17 


Megestrol Acetate 40 mg PO DAILY 03/01/17 


Montelukast Sodium [Singulair 10 mg Tablet] 10 mg PO QPM 03/01/17 


Roflumilast [Daliresp 500 mcg Tablet] 500 mg PO DAILY 03/01/17 


Sertraline HCl [Zoloft 50 mg Tablet] 50 mg PO DAILY 03/01/17 


Simvastatin [Zocor 40 mg Tablet] 40 mg PO QPM 03/01/17 


Tamsulosin HCl [Flomax 0.4 mg Cap.sr] 2 cap PO QHS 03/01/17 


Tiotropium Bromide [Spiriva Handihaler 18 mcg/dose (30 Dose)] 1 cap IH DAILY 03/ 01/17 











History of Present Illness


History of Present Illness: 


PEDRO REYES is a 76 year old male, history of chronic atrial fibrillation on 

chronic anticoagulation,he had colonoscopy on 02/21/2017, he polyps removed 

from the colon He came to emergency room with rectal bleeding, when he 

presented he was hypotensive with hemoglobin 7, he was treated in the emergency 

room with normal saline and subsequently receive 2 units of packed red blood 

cells.  He was seen by GI physician, He had  Emergency Colonoscopy and was 

found to have ulcer in the sigmoid colon that was actively bleeding.  The ulcer 

base was injected with epinephrine,the bleeding is presently controlled.





Hospital Course


Hospital Course: 


Patient was admitted when he presented with acute lower GI bleed, there was 

hypotension in the setting of lower GI bleed, he was fluid resuscitated on was 

transfused with a total of 4 units of packed red blood cells, he was seen by 

Dr. Saenz, gastroenterologist on he underwent emergency colonoscopy he was 

found to have a bleeding ulcer in the sigmoid colon.  He had colonoscopy couple 

of days ago and polyps were removed from the sigmoid colon and it was felt that 

the bleeding site was from the area where the polyps were removed.  Patient 

stabilized and was monitored in the hospital subsequently.





Physical Exam


Vital Signs: 


 











Temp Pulse Resp BP Pulse Ox


 


 98.6 F   91   15   118/51 L  100 


 


 03/03/17 15:04  03/03/17 15:04  03/03/17 15:04  03/03/17 15:04  03/03/17 15:04








 Intake & Output











 03/02/17 03/03/17 03/04/17





 06:59 06:59 06:59


 


Intake Total 


 


Output Total 1700 1830 375


 


Balance -1500 -1050 2406


 


Weight 66.2 kg 65.5 kg 











General appearance: PRESENT: thin


Eye exam: PRESENT: PERRLA


Respiratory exam: PRESENT: decreased breath sounds


Cardiovascular exam: PRESENT: +S1, +S2


GI/Abdominal exam: PRESENT: soft


Neurological exam: PRESENT: alert, CN II-XII grossly intact





Results


Laboratory Results: 


 





 03/03/17 03:41 





 03/03/17 03:41 





 











  03/03/17 03/03/17 03/03/17





  00:17 03:41 03:41


 


WBC  5.2  5.3 


 


RBC  3.54 L  3.63 L 


 


Hgb  10.0 L  10.1 L 


 


Hct  28.8 L  29.8 L 


 


MCV  81  82 


 


MCH  28.3  27.8 


 


MCHC  34.8  33.9 


 


RDW  19.8 H  19.8 H 


 


Plt Count  122 L  127 L 


 


Seg Neutrophils %  77.0  77.7 


 


Lymphocytes %  13.0  11.6 L 


 


Monocytes %  9.1  9.6 


 


Eosinophils %  0.5  0.7 


 


Basophils %  0.4  0.4 


 


Absolute Neutrophils  4.0  4.1 


 


Absolute Lymphocytes  0.7  0.6 


 


Absolute Monocytes  0.5  0.5 


 


Absolute Eosinophils  0.0  0.0 


 


Absolute Basophils  0.0  0.0 


 


Sodium    137.5


 


Potassium    3.4 L


 


Chloride    101


 


Carbon Dioxide    27


 


Anion Gap    10


 


BUN    6 L


 


Creatinine    0.70


 


Est GFR ( Amer)    > 60


 


Est GFR (Non-Af Amer)    > 60


 


Glucose    83


 


Calcium    9.3


 


Total Bilirubin    0.9


 


AST    15 L


 


ALT    26


 


Alkaline Phosphatase    47


 


Ammonia   


 


Total Protein    4.8 L


 


Albumin    2.8 L














  03/03/17





  03:41


 


WBC 


 


RBC 


 


Hgb 


 


Hct 


 


MCV 


 


MCH 


 


MCHC 


 


RDW 


 


Plt Count 


 


Seg Neutrophils % 


 


Lymphocytes % 


 


Monocytes % 


 


Eosinophils % 


 


Basophils % 


 


Absolute Neutrophils 


 


Absolute Lymphocytes 


 


Absolute Monocytes 


 


Absolute Eosinophils 


 


Absolute Basophils 


 


Sodium 


 


Potassium 


 


Chloride 


 


Carbon Dioxide 


 


Anion Gap 


 


BUN 


 


Creatinine 


 


Est GFR ( Amer) 


 


Est GFR (Non-Af Amer) 


 


Glucose 


 


Calcium 


 


Total Bilirubin 


 


AST 


 


ALT 


 


Alkaline Phosphatase 


 


Ammonia  < 8.7 L


 


Total Protein 


 


Albumin 








 











  03/01/17 03/01/17 03/01/17





  00:37 00:37 07:14


 


Creatine Kinase  29 L   43 L


 


Troponin I   < 0.012 














  03/01/17 03/01/17 03/01/17





  07:14 12:39 12:39


 


Creatine Kinase   41 L 


 


Troponin I  < 0.012   < 0.012

## 2017-03-04 VITALS — SYSTOLIC BLOOD PRESSURE: 119 MMHG | DIASTOLIC BLOOD PRESSURE: 54 MMHG

## 2017-03-04 RX ADMIN — DILTIAZEM HYDROCHLORIDE SCH MG: 180 CAPSULE, EXTENDED RELEASE ORAL at 10:08

## 2017-03-04 RX ADMIN — FINASTERIDE SCH MG: 5 TABLET, FILM COATED ORAL at 10:08

## 2017-03-04 RX ADMIN — LANSOPRAZOLE SCH MG: 30 TABLET, ORALLY DISINTEGRATING, DELAYED RELEASE ORAL at 10:09

## 2017-03-04 RX ADMIN — MESALAMINE SCH MG: 400 CAPSULE, DELAYED RELEASE ORAL at 10:08

## 2017-03-04 RX ADMIN — TIOTROPIUM BROMIDE SCH CAP: 18 CAPSULE ORAL; RESPIRATORY (INHALATION) at 10:09

## 2017-03-04 RX ADMIN — ROFLUMILAST SCH MCG: 500 TABLET ORAL at 10:09

## 2017-03-04 RX ADMIN — FLUTICASONE PROPIONATE AND SALMETEROL SCH INH: 50; 250 POWDER RESPIRATORY (INHALATION) at 10:09

## 2017-07-14 ENCOUNTER — HOSPITAL ENCOUNTER (INPATIENT)
Dept: HOSPITAL 62 - 3N | Age: 77
LOS: 5 days | Discharge: NURSING, REHAB OR LONG-TERM CARE | DRG: 315 | End: 2017-07-19
Attending: INTERNAL MEDICINE | Admitting: INTERNAL MEDICINE
Payer: MEDICARE

## 2017-07-14 DIAGNOSIS — I48.2: ICD-10-CM

## 2017-07-14 DIAGNOSIS — I25.10: ICD-10-CM

## 2017-07-14 DIAGNOSIS — K44.9: ICD-10-CM

## 2017-07-14 DIAGNOSIS — J43.9: ICD-10-CM

## 2017-07-14 DIAGNOSIS — D64.9: ICD-10-CM

## 2017-07-14 DIAGNOSIS — Z79.899: ICD-10-CM

## 2017-07-14 DIAGNOSIS — K21.0: ICD-10-CM

## 2017-07-14 DIAGNOSIS — I10: ICD-10-CM

## 2017-07-14 DIAGNOSIS — E46: ICD-10-CM

## 2017-07-14 DIAGNOSIS — I95.9: Primary | ICD-10-CM

## 2017-07-14 DIAGNOSIS — E87.6: ICD-10-CM

## 2017-07-14 DIAGNOSIS — J96.12: ICD-10-CM

## 2017-07-14 DIAGNOSIS — E78.5: ICD-10-CM

## 2017-07-14 DIAGNOSIS — M19.90: ICD-10-CM

## 2017-07-14 DIAGNOSIS — Z99.81: ICD-10-CM

## 2017-07-14 LAB
ALBUMIN SERPL-MCNC: 4.1 G/DL (ref 3.5–5)
ALP SERPL-CCNC: 80 U/L (ref 38–126)
ALT SERPL-CCNC: 22 U/L (ref 21–72)
ANION GAP SERPL CALC-SCNC: 11 MMOL/L (ref 5–19)
AST SERPL-CCNC: 12 U/L (ref 17–59)
BASE EXCESS BLDA CALC-SCNC: 4.3 MMOL/L
BILIRUB DIRECT SERPL-MCNC: 0.3 MG/DL (ref 0–0.4)
BILIRUB SERPL-MCNC: 0.5 MG/DL (ref 0.2–1.3)
BUN SERPL-MCNC: 14 MG/DL (ref 7–20)
CALCIUM: 10.3 MG/DL (ref 8.4–10.2)
CHLORIDE SERPL-SCNC: 99 MMOL/L (ref 98–107)
CO2 SERPL-SCNC: 33 MMOL/L (ref 22–30)
CREAT SERPL-MCNC: 0.91 MG/DL (ref 0.52–1.25)
ERYTHROCYTE [DISTWIDTH] IN BLOOD BY AUTOMATED COUNT: 15.4 % (ref 11.5–14)
GLUCOSE SERPL-MCNC: 105 MG/DL (ref 75–110)
HCT VFR BLD CALC: 27.7 % (ref 37.9–51)
HGB BLD-MCNC: 8.8 G/DL (ref 13.5–17)
HGB HCT DIFFERENCE: -1.3
MCH RBC QN AUTO: 26.3 PG (ref 27–33.4)
MCHC RBC AUTO-ENTMCNC: 32 G/DL (ref 32–36)
MCV RBC AUTO: 82 FL (ref 80–97)
POTASSIUM SERPL-SCNC: 3.4 MMOL/L (ref 3.6–5)
PROT SERPL-MCNC: 7.1 G/DL (ref 6.3–8.2)
RBC # BLD AUTO: 3.36 10^6/UL (ref 4.35–5.55)
SAO2 % BLDA: 99 % (ref 94–98)
SODIUM SERPL-SCNC: 143.2 MMOL/L (ref 137–145)
WBC # BLD AUTO: 5.5 10^3/UL (ref 4–10.5)

## 2017-07-14 PROCEDURE — 71250 CT THORAX DX C-: CPT

## 2017-07-14 PROCEDURE — 71020: CPT

## 2017-07-14 PROCEDURE — 86900 BLOOD TYPING SEROLOGIC ABO: CPT

## 2017-07-14 PROCEDURE — 36600 WITHDRAWAL OF ARTERIAL BLOOD: CPT

## 2017-07-14 PROCEDURE — P9016 RBC LEUKOCYTES REDUCED: HCPCS

## 2017-07-14 PROCEDURE — 87186 SC STD MICRODIL/AGAR DIL: CPT

## 2017-07-14 PROCEDURE — 83735 ASSAY OF MAGNESIUM: CPT

## 2017-07-14 PROCEDURE — 85027 COMPLETE CBC AUTOMATED: CPT

## 2017-07-14 PROCEDURE — 36415 COLL VENOUS BLD VENIPUNCTURE: CPT

## 2017-07-14 PROCEDURE — 93010 ELECTROCARDIOGRAM REPORT: CPT

## 2017-07-14 PROCEDURE — 86920 COMPATIBILITY TEST SPIN: CPT

## 2017-07-14 PROCEDURE — 93005 ELECTROCARDIOGRAM TRACING: CPT

## 2017-07-14 PROCEDURE — 82803 BLOOD GASES ANY COMBINATION: CPT

## 2017-07-14 PROCEDURE — 87077 CULTURE AEROBIC IDENTIFY: CPT

## 2017-07-14 PROCEDURE — 87040 BLOOD CULTURE FOR BACTERIA: CPT

## 2017-07-14 PROCEDURE — 86901 BLOOD TYPING SEROLOGIC RH(D): CPT

## 2017-07-14 PROCEDURE — 80076 HEPATIC FUNCTION PANEL: CPT

## 2017-07-14 PROCEDURE — 3E0F73Z INTRODUCTION OF ANTI-INFLAMMATORY INTO RESPIRATORY TRACT, VIA NATURAL OR ARTIFICIAL OPENING: ICD-10-PCS | Performed by: INTERNAL MEDICINE

## 2017-07-14 PROCEDURE — 80048 BASIC METABOLIC PNL TOTAL CA: CPT

## 2017-07-14 PROCEDURE — 36430 TRANSFUSION BLD/BLD COMPNT: CPT

## 2017-07-14 PROCEDURE — 81001 URINALYSIS AUTO W/SCOPE: CPT

## 2017-07-14 PROCEDURE — 84100 ASSAY OF PHOSPHORUS: CPT

## 2017-07-14 PROCEDURE — 86850 RBC ANTIBODY SCREEN: CPT

## 2017-07-14 PROCEDURE — 85025 COMPLETE CBC W/AUTO DIFF WBC: CPT

## 2017-07-14 PROCEDURE — 80053 COMPREHEN METABOLIC PANEL: CPT

## 2017-07-14 RX ADMIN — DABIGATRAN ETEXILATE MESYLATE SCH MG: 150 CAPSULE ORAL at 18:22

## 2017-07-14 RX ADMIN — FLUTICASONE PROPIONATE AND SALMETEROL SCH INH: 50; 250 POWDER RESPIRATORY (INHALATION) at 18:27

## 2017-07-14 RX ADMIN — MEGESTROL ACETATE SCH MG: 20 TABLET ORAL at 18:23

## 2017-07-14 RX ADMIN — SIMVASTATIN SCH MG: 40 TABLET, FILM COATED ORAL at 21:49

## 2017-07-14 RX ADMIN — TAMSULOSIN HYDROCHLORIDE SCH MG: 0.4 CAPSULE ORAL at 21:49

## 2017-07-14 RX ADMIN — MONTELUKAST SODIUM SCH MG: 10 TABLET, FILM COATED ORAL at 21:49

## 2017-07-14 NOTE — RADIOLOGY REPORT (SQ)
EXAM DESCRIPTION:  CT CHEST WITHOUT



COMPLETED DATE/TIME:  7/14/2017 7:52 pm



REASON FOR STUDY:  copd



COMPARISON:  Chest x-ray dated 7/14/2017.  Chest CT dated 6/6/2014.



TECHNIQUE:  CT scan performed of the chest without intravenous contrast.  Images reviewed with lung, 
soft tissue and bone windows.  Reconstructed coronal and sagittal MPR images reviewed.  All images st
ored on PACS.

All CT scanners at this facility use dose modulation, iterative reconstruction, and/or weight based d
osing when appropriate to reduce radiation dose to as low as reasonably achievable (ALARA).

[facility]: Dose Right  [facility]: CareDose    [facility]: Dose Right    [facility]: Teradose 4D    [facility]: Smart Technologies



RADIATION DOSE:  Up-to-date CT equipment and radiation dose reduction techniques were employed. CTDIv
ol: 5.3 mGy. DLP: 241 mGy-cm. mGy.



LIMITATIONS:  No technical limitations.



FINDINGS:  LUNGS AND PLEURA: Hyperinflation with marked chronic bullous emphysema.  No masses, infilt
rates, pneumothorax.  No pleural effusions, calcifications.

HILAR AND MEDIASTINAL STRUCTURES: No identified masses or abnormal nodes.  No obvious aneurysm.

HEART AND VASCULAR STRUCTURES: No aneurysm.  No pericardial effusion.

UPPER ABDOMEN: No significant findings.  Limited exam.

THYROID AND OTHER SOFT TISSUES: No masses.  No adenopathy.

BONES: No significant finding.

HARDWARE: None in the chest.

OTHER: No other significant findings.



IMPRESSION:  CHRONIC BULLOUS EMPHYSEMA.  NO ACUTE FINDING ON NON-CONTRASTED CHEST CT.



TECHNICAL DOCUMENTATION:  JOB ID:  [id number]

Quality ID # [id number]: Final reports with documentation of one or more dose reduction techniques (e.g., Au
tomated exposure control, adjustment of the mA and/or kV according to patient size, use of iterative 
reconstruction technique)

 2011 [employer]- All Rights Reserved

## 2017-07-14 NOTE — RADIOLOGY REPORT (SQ)
EXAM DESCRIPTION:  CHEST PA/LAT



COMPLETED DATE/TIME:  7/14/2017 6:46 pm



REASON FOR STUDY:  afib w/ RVR



COMPARISON:  2/7/2014.



NUMBER OF VIEWS:  Two view.



TECHNIQUE:  Frontal and lateral radiographic views of the chest acquired.



LIMITATIONS:  None.



FINDINGS:  LUNGS AND PLEURA: No opacities, masses or pneumothorax. No pleural effusion. Attenuated bl
ood vessels and flattened mallory-diaphragms.

MEDIASTINUM AND HILAR STRUCTURES: No masses.  No contour abnormalities.

HEART AND VASCULAR STRUCTURES: Heart normal in size and contour.  No evidence for failure.

BONES: No acute findings.

HARDWARE: None in the chest.

OTHER: No other significant finding.



IMPRESSION:  COPD.  NO ACUTE RADIOGRAPHIC FINDING IN THE CHEST.



TECHNICAL DOCUMENTATION:  JOB ID:  [id number]

 2011 [employer]- All Rights Reserved

## 2017-07-14 NOTE — EKG REPORT
SEVERITY:- ABNORMAL ECG -

SINUS TACHYCARDIA

MULTIFORM VENTRICULAR PREMATURE COMPLEXES

ANTERIOR INFARCT, AGE INDETERMINATE

BORDERLINE T ABNORMALITIES, INFERIOR LEADS

:

Confirmed by: Cheo Sultana MD 14-Jul-2017 20:21:41

## 2017-07-15 LAB
ANION GAP SERPL CALC-SCNC: 6 MMOL/L (ref 5–19)
APPEARANCE UR: CLEAR
BASOPHILS # BLD AUTO: 0 10^3/UL (ref 0–0.2)
BASOPHILS NFR BLD AUTO: 0.3 % (ref 0–2)
BILIRUB UR QL STRIP: NEGATIVE
BUN SERPL-MCNC: 12 MG/DL (ref 7–20)
CALCIUM: 9.6 MG/DL (ref 8.4–10.2)
CHLORIDE SERPL-SCNC: 100 MMOL/L (ref 98–107)
CO2 SERPL-SCNC: 32 MMOL/L (ref 22–30)
CREAT SERPL-MCNC: 0.81 MG/DL (ref 0.52–1.25)
EOSINOPHIL # BLD AUTO: 0.1 10^3/UL (ref 0–0.6)
EOSINOPHIL NFR BLD AUTO: 1.1 % (ref 0–6)
ERYTHROCYTE [DISTWIDTH] IN BLOOD BY AUTOMATED COUNT: 15.2 % (ref 11.5–14)
ERYTHROCYTE [DISTWIDTH] IN BLOOD BY AUTOMATED COUNT: 15.6 % (ref 11.5–14)
GLUCOSE SERPL-MCNC: 102 MG/DL (ref 75–110)
GLUCOSE UR STRIP-MCNC: NEGATIVE MG/DL
HCT VFR BLD CALC: 21.1 % (ref 37.9–51)
HCT VFR BLD CALC: 27.6 % (ref 37.9–51)
HGB BLD-MCNC: 6.8 G/DL (ref 13.5–17)
HGB BLD-MCNC: 8.9 G/DL (ref 13.5–17)
HGB HCT DIFFERENCE: -0.7
HGB HCT DIFFERENCE: -0.9
KETONES UR STRIP-MCNC: NEGATIVE MG/DL
LYMPHOCYTES # BLD AUTO: 1 10^3/UL (ref 0.5–4.7)
LYMPHOCYTES NFR BLD AUTO: 20.9 % (ref 13–45)
MAGNESIUM SERPL-MCNC: 1.6 MG/DL (ref 1.6–2.3)
MCH RBC QN AUTO: 26.1 PG (ref 27–33.4)
MCH RBC QN AUTO: 27.4 PG (ref 27–33.4)
MCHC RBC AUTO-ENTMCNC: 32.3 G/DL (ref 32–36)
MCHC RBC AUTO-ENTMCNC: 32.4 G/DL (ref 32–36)
MCV RBC AUTO: 81 FL (ref 80–97)
MCV RBC AUTO: 85 FL (ref 80–97)
MONOCYTES # BLD AUTO: 0.4 10^3/UL (ref 0.1–1.4)
MONOCYTES NFR BLD AUTO: 8.6 % (ref 3–13)
NEUTROPHILS # BLD AUTO: 3.4 10^3/UL (ref 1.7–8.2)
NEUTS SEG NFR BLD AUTO: 69.1 % (ref 42–78)
NITRITE UR QL STRIP: NEGATIVE
PH UR STRIP: 5 [PH] (ref 5–9)
PHOSPHATE SERPL-MCNC: 2.3 MG/DL (ref 2.5–4.5)
POTASSIUM SERPL-SCNC: 3 MMOL/L (ref 3.6–5)
PROT UR STRIP-MCNC: NEGATIVE MG/DL
RBC # BLD AUTO: 2.6 10^6/UL (ref 4.35–5.55)
RBC # BLD AUTO: 3.26 10^6/UL (ref 4.35–5.55)
SODIUM SERPL-SCNC: 138.1 MMOL/L (ref 137–145)
SP GR UR STRIP: 1.02
UROBILINOGEN UR-MCNC: NEGATIVE MG/DL (ref ?–2)
WBC # BLD AUTO: 4.8 10^3/UL (ref 4–10.5)
WBC # BLD AUTO: 6.6 10^3/UL (ref 4–10.5)

## 2017-07-15 PROCEDURE — 30243N1 TRANSFUSION OF NONAUTOLOGOUS RED BLOOD CELLS INTO CENTRAL VEIN, PERCUTANEOUS APPROACH: ICD-10-PCS | Performed by: INTERNAL MEDICINE

## 2017-07-15 RX ADMIN — POTASSIUM CHLORIDE SCH MEQ: 750 TABLET, FILM COATED, EXTENDED RELEASE ORAL at 06:18

## 2017-07-15 RX ADMIN — MONTELUKAST SODIUM SCH MG: 10 TABLET, FILM COATED ORAL at 21:07

## 2017-07-15 RX ADMIN — LANSOPRAZOLE SCH MG: 30 TABLET, ORALLY DISINTEGRATING, DELAYED RELEASE ORAL at 10:43

## 2017-07-15 RX ADMIN — TAMSULOSIN HYDROCHLORIDE SCH MG: 0.4 CAPSULE ORAL at 21:07

## 2017-07-15 RX ADMIN — DABIGATRAN ETEXILATE MESYLATE SCH MG: 150 CAPSULE ORAL at 21:07

## 2017-07-15 RX ADMIN — ROFLUMILAST SCH MCG: 500 TABLET ORAL at 10:45

## 2017-07-15 RX ADMIN — POTASSIUM CHLORIDE SCH MEQ: 750 TABLET, FILM COATED, EXTENDED RELEASE ORAL at 18:50

## 2017-07-15 RX ADMIN — SIMVASTATIN SCH MG: 40 TABLET, FILM COATED ORAL at 21:07

## 2017-07-15 RX ADMIN — SERTRALINE HYDROCHLORIDE SCH MG: 50 TABLET ORAL at 10:43

## 2017-07-15 RX ADMIN — SODIUM CHLORIDE PRN ML: 9 INJECTION, SOLUTION INTRAVENOUS at 04:28

## 2017-07-15 RX ADMIN — DABIGATRAN ETEXILATE MESYLATE SCH MG: 150 CAPSULE ORAL at 11:03

## 2017-07-15 RX ADMIN — DILTIAZEM HYDROCHLORIDE SCH MG: 180 CAPSULE, EXTENDED RELEASE ORAL at 10:42

## 2017-07-15 RX ADMIN — POTASSIUM CHLORIDE SCH MEQ: 750 TABLET, FILM COATED, EXTENDED RELEASE ORAL at 10:42

## 2017-07-15 RX ADMIN — FLUTICASONE PROPIONATE AND SALMETEROL SCH INH: 50; 250 POWDER RESPIRATORY (INHALATION) at 18:53

## 2017-07-15 RX ADMIN — HYDROCHLOROTHIAZIDE SCH MG: 25 TABLET ORAL at 18:55

## 2017-07-15 RX ADMIN — TIOTROPIUM BROMIDE SCH CAP: 18 CAPSULE ORAL; RESPIRATORY (INHALATION) at 10:43

## 2017-07-15 RX ADMIN — DABIGATRAN ETEXILATE MESYLATE SCH MG: 150 CAPSULE ORAL at 05:13

## 2017-07-15 RX ADMIN — FINASTERIDE SCH MG: 5 TABLET, FILM COATED ORAL at 10:43

## 2017-07-15 RX ADMIN — FLUTICASONE PROPIONATE AND SALMETEROL SCH INH: 50; 250 POWDER RESPIRATORY (INHALATION) at 05:14

## 2017-07-15 NOTE — PDOC H&P
History of Present Illness


Admission Date/PCP: 


  07/14/17 16:21





  MIKE MCCULLOUGH MD





History of Present Illness: 


PEDRO REYES is a 76 year old male, he came to the office with his family in 

the wheelchair for evaluation of progressive weight loss, complete lack of 

energy, low blood pressure, shortness of breath on mild exertion.  He has 

multiple comorbid conditions including chronic obstructive lung disease, 

chronic respiratory failure on home oxygen, chronic atrial fibrillation on 

anticoagulant.  In the office he was evaluated the blood pressure was low ,80 

systolic ,with a pulse of 140.  He was admitted directly from the office 

because of concern for hypotension in the setting of atrial fibrillation with 

rapid ventricular response. The initial hemogram showed hemoglobin 8, the body 

mass index is 14.5.  CT chest without contrast was done showed hyperinflation 

with marked chronic bullous emphysema.





Past Medical History


Cardiac Medical History: Reports: Atrial Fibrillation - paroxysmal, Coronary 

Artery Disease, Hyperlipidema, Hypertension


Pulmonary Medical History: Reports: Chronic Obstructive Pulmonary Disease (COPD)

, Respiratory Failure, Other - Chronic respiratory failure due to end stage 

COPD on  chronic oxygen therapy


GI Medical History: Reports: Hiatal Hernia


Musculoskeltal Medical History: Reports: Arthritis


Hematology: Reports: Anemia - chronic





Social History


Information Source: Patient


Smoking Status: Former Smoker


Frequency of Alcohol Use: None


Hx Recreational Drug Use: No


Hx Prescription Drug Abuse: No





Family History


Family History: Reviewed & Not Pertinent


Parental Family History Reviewed: Yes


Children Family History Reviewed: Yes


Sibling(s) Family History Reviewed.: Yes





Medication/Allergy


Home Medications: 








Dabigatran Etexilate Mesylate [Pradaxa 150 mg Capsule] 150 mg PO Q12 07/14/17 


Diltiazem HCl [Diltiazem 24Hr ER] 180 mg PO DAILY 07/14/17 


Esomeprazole Mag Trihydrate [Nexium] 40 mg PO DAILY 07/14/17 


Finasteride [Proscar 5 mg Tablet] 5 mg PO DAILY 07/14/17 


Fluticasone/Salmeterol [Advair 250-50 Diskus 28 dose] 1 puff IH Q12 07/14/17 


Hydrochlorothiazide 25 mg PO DAILY 07/14/17 


Megestrol Acetate 40 mg PO DAILY 07/14/17 


Montelukast Sodium [Singulair 10 mg Tablet] 10 mg PO QPM 07/14/17 


Roflumilast [Daliresp 500 mcg Tablet] 500 mcg PO DAILY 07/14/17 


Sertraline HCl [Zoloft 50 mg Tablet] 50 mg PO DAILY 07/14/17 


Simvastatin 40 mg PO QPM 07/14/17 


Tamsulosin HCl [Flomax 0.4 mg Cap.sr] 0.8 mg PO QHS 07/14/17 


Tiotropium Bromide [Spiriva Handihaler 18 mcg/dose (30 Dose)] 1 cap IH DAILY 07/ 14/17 








Allergies/Adverse Reactions: 


 





No Known Allergies Allergy (Verified 02/21/17 15:21)


 











Review of Systems


Constitutional: PRESENT: fatigue, weight loss


Eyes: ABSENT: visual disturbances


Ears: ABSENT: hearing changes


Cardiovascular: PRESENT: dyspnea on exertion


Respiratory: PRESENT: dyspnea


Gastrointestinal: ABSENT: abdominal pain, constipation, diarrhea, hematemesis, 

hematochezia, nausea, vomiting


Genitourinary: ABSENT: dysuria, hematuria


Musculoskeletal: ABSENT: joint swelling


Integumentary: ABSENT: rash, wounds


Neurological: ABSENT: abnormal gait, abnormal speech, confusion, dizziness, 

focal weakness, syncope


Psychiatric: ABSENT: anxiety, depression, homidical ideation, suicidal ideation


Endocrine: ABSENT: cold intolerance, heat intolerance, menstrual abnormalities, 

polydipsia, polyuria


Hematologic/Lymphatic: ABSENT: easy bleeding, easy bruising, lymphadenopathy





Physical Exam


Vital Signs: 


 











Temp Pulse Resp BP Pulse Ox


 


 98.6 F   101 H  18   106/48 L  93 


 


 07/15/17 11:24  07/15/17 11:24  07/15/17 11:24  07/15/17 11:24  07/15/17 11:24








 Intake & Output











 07/14/17 07/15/17 07/16/17





 06:59 06:59 06:59


 


Intake Total  2659 0


 


Output Total  375 


 


Balance  2284 0


 


Weight  52.5 kg 











General appearance: PRESENT: cooperative, thin


Head exam: PRESENT: atraumatic, normocephalic


Eye exam: PRESENT: conjunctiva pale


Mouth exam: PRESENT: dry mucosa


Respiratory exam: PRESENT: decreased breath sounds


Cardiovascular exam: PRESENT: irregular rhythm, RRR, +S1, +S2, tachycardia


Vascular exam: PRESENT: normal capillary refill


GI/Abdominal exam: PRESENT: soft


Rectal exam: PRESENT: deferred


Neurological exam: PRESENT: alert, CN II-XII grossly intact


Psychiatric exam: PRESENT: appropriate affect


Skin exam: PRESENT: dry, intact, warm





Results


Laboratory Results: 


 





 07/15/17 03:54 





 07/15/17 03:54 





 











  07/14/17 07/14/17 07/14/17





  14:45 15:00 15:00


 


WBC   5.5 


 


RBC   3.36 L 


 


Hgb   8.8 L 


 


Hct   27.7 L 


 


MCV   82 


 


MCH   26.3 L 


 


MCHC   32.0 


 


RDW   15.4 H 


 


Plt Count   352 


 


Seg Neutrophils %   


 


Lymphocytes %   


 


Monocytes %   


 


Eosinophils %   


 


Basophils %   


 


Absolute Neutrophils   


 


Absolute Lymphocytes   


 


Absolute Monocytes   


 


Absolute Eosinophils   


 


Absolute Basophils   


 


Carbonic Acid  1.55 H  


 


HCO3/H2CO3 Ratio  19:1  


 


ABG pH  7.38  


 


ABG pCO2  51.5 H  


 


ABG pO2  160.0 H  


 


ABG HCO3  30.1 H  


 


ABG O2 Saturation  99.0 H  


 


ABG Base Excess  4.3  


 


FiO2  3.5 LPM  


 


Sodium    143.2


 


Potassium    3.4 L


 


Chloride    99


 


Carbon Dioxide    33 H


 


Anion Gap    11


 


BUN    14


 


Creatinine    0.91


 


Est GFR ( Amer)    > 60


 


Est GFR (Non-Af Amer)    > 60


 


Glucose    105


 


Calcium    10.3 H


 


Phosphorus   


 


Magnesium   


 


Total Bilirubin    0.5


 


AST    12 L


 


ALT    22


 


Alkaline Phosphatase    80


 


Total Protein    7.1


 


Albumin    4.1


 


Urine Color   


 


Urine Appearance   


 


Urine pH   


 


Ur Specific Gravity   


 


Urine Protein   


 


Urine Glucose (UA)   


 


Urine Ketones   


 


Urine Blood   


 


Urine Nitrite   


 


Ur Leukocyte Esterase   


 


Urine WBC (Auto)   


 


Urine RBC (Auto)   


 


Blood Type   


 


Antibody Screen   














  07/15/17 07/15/17 07/15/17





  01:29 03:54 03:54


 


WBC   4.8 


 


RBC   2.60 L 


 


Hgb   6.8 L 


 


Hct   21.1 L 


 


MCV   81 


 


MCH   26.1 L 


 


MCHC   32.3 


 


RDW   15.6 H 


 


Plt Count   248 


 


Seg Neutrophils %   69.1 


 


Lymphocytes %   20.9 


 


Monocytes %   8.6 


 


Eosinophils %   1.1 


 


Basophils %   0.3 


 


Absolute Neutrophils   3.4 


 


Absolute Lymphocytes   1.0 


 


Absolute Monocytes   0.4 


 


Absolute Eosinophils   0.1 


 


Absolute Basophils   0.0 


 


Carbonic Acid   


 


HCO3/H2CO3 Ratio   


 


ABG pH   


 


ABG pCO2   


 


ABG pO2   


 


ABG HCO3   


 


ABG O2 Saturation   


 


ABG Base Excess   


 


FiO2   


 


Sodium    138.1


 


Potassium    3.0 L*


 


Chloride    100


 


Carbon Dioxide    32 H


 


Anion Gap    6


 


BUN    12


 


Creatinine    0.81


 


Est GFR ( Amer)    > 60


 


Est GFR (Non-Af Amer)    > 60


 


Glucose    102


 


Calcium    9.6


 


Phosphorus    2.3 L


 


Magnesium    1.6


 


Total Bilirubin   


 


AST   


 


ALT   


 


Alkaline Phosphatase   


 


Total Protein   


 


Albumin   


 


Urine Color  YELLOW  


 


Urine Appearance  CLEAR  


 


Urine pH  5.0  


 


Ur Specific Gravity  1.016  


 


Urine Protein  NEGATIVE  


 


Urine Glucose (UA)  NEGATIVE  


 


Urine Ketones  NEGATIVE  


 


Urine Blood  SMALL H  


 


Urine Nitrite  NEGATIVE  


 


Ur Leukocyte Esterase  NEGATIVE  


 


Urine WBC (Auto)  1  


 


Urine RBC (Auto)  1  


 


Blood Type   


 


Antibody Screen   














  07/15/17





  06:09


 


WBC 


 


RBC 


 


Hgb 


 


Hct 


 


MCV 


 


MCH 


 


MCHC 


 


RDW 


 


Plt Count 


 


Seg Neutrophils % 


 


Lymphocytes % 


 


Monocytes % 


 


Eosinophils % 


 


Basophils % 


 


Absolute Neutrophils 


 


Absolute Lymphocytes 


 


Absolute Monocytes 


 


Absolute Eosinophils 


 


Absolute Basophils 


 


Carbonic Acid 


 


HCO3/H2CO3 Ratio 


 


ABG pH 


 


ABG pCO2 


 


ABG pO2 


 


ABG HCO3 


 


ABG O2 Saturation 


 


ABG Base Excess 


 


FiO2 


 


Sodium 


 


Potassium 


 


Chloride 


 


Carbon Dioxide 


 


Anion Gap 


 


BUN 


 


Creatinine 


 


Est GFR ( Amer) 


 


Est GFR (Non-Af Amer) 


 


Glucose 


 


Calcium 


 


Phosphorus 


 


Magnesium 


 


Total Bilirubin 


 


AST 


 


ALT 


 


Alkaline Phosphatase 


 


Total Protein 


 


Albumin 


 


Urine Color 


 


Urine Appearance 


 


Urine pH 


 


Ur Specific Gravity 


 


Urine Protein 


 


Urine Glucose (UA) 


 


Urine Ketones 


 


Urine Blood 


 


Urine Nitrite 


 


Ur Leukocyte Esterase 


 


Urine WBC (Auto) 


 


Urine RBC (Auto) 


 


Blood Type  O POSITIVE


 


Antibody Screen  NEGATIVE











Impressions: 


 





Chest CT  07/14/17 00:00


IMPRESSION:  CHRONIC BULLOUS EMPHYSEMA.  NO ACUTE FINDING ON NON-CONTRASTED 

CHEST CT.


 








Chest X-Ray  07/14/17 00:00


IMPRESSION:  COPD.  NO ACUTE RADIOGRAPHIC FINDING IN THE CHEST.


 














Assessment & Plan





- Diagnosis


(1) Hypotension


Qualifiers: 


     Hypotension type: unspecified hypotension type        Qualified Code(s): 

I95.9 - Hypotension, unspecified  


Is this a current diagnosis for this admission?: YesPlan: 





The blood pressure was low in this setting of Atrial fibrillation with rapid 

ventricular response, the blood pressure recorded in the hospital is normal he 

will be slowly hydrated with IV fluid








(2) Atrial fibrillation with rapid ventricular response


Is this a current diagnosis for this admission?: Yes





(3) Undernutrition


Is this a current diagnosis for this admission?: YesPlan: 


The body mass index is 14.5 patient stated that he has good appetite despite 

adequate intake he continues to lose weight, he has progressive weight loss, he 

was extensively evaluated outpatient for weight loss, he had a CT scan of the 

chest, abdomen and pelvis and liver are negative he also underwent colonoscopy 

and EGD on it was negative there was no metabolic explanation for the weight 

loss








(4) Chronic respiratory failure


Qualifiers: 


     Respiratory failure complication: hypercapnia        Qualified Code(s): 

J96.12 - Chronic respiratory failure with hypercapnia  


Is this a current diagnosis for this admission?: YesPlan: 


He has chronic respiratory failure from end-stage COPD, he is on home oxygen.








(5) End stage chronic obstructive pulmonary disease


Is this a current diagnosis for this admission?: Yes





(6) Anemia


Qualifiers: 


     Anemia type: unspecified type        Qualified Code(s): D64.9 - Anemia, 

unspecified  


Is this a current diagnosis for this admission?: YesPlan: 


He has anemia most likely from nutritional deficiency state he may need blood 

transfusion

## 2017-07-15 NOTE — PDOC PROGRESS REPORT
Subjective


Progress Note for:: 07/15/17


Subjective:: 


Patient was admitted yesterday, the hemoglobin from today's lab work is 6, he 

would need blood transfusion.





Physical Exam


Vital Signs: 


 











Temp Pulse Resp BP Pulse Ox


 


 98.2 F   115 H  18   104/49 L  100 


 


 07/15/17 14:00  07/15/17 14:00  07/15/17 12:45  07/15/17 14:00  07/15/17 13:10








 Intake & Output











 07/14/17 07/15/17 07/16/17





 06:59 06:59 06:59


 


Intake Total  2659 1160


 


Output Total  375 200


 


Balance  2284 960


 


Weight  52.5 kg 











General appearance: PRESENT: no acute distress


Eye exam: PRESENT: PERRLA


Neck exam: PRESENT: full ROM


Respiratory exam: PRESENT: decreased breath sounds


Cardiovascular exam: PRESENT: +S1, +S2


GI/Abdominal exam: PRESENT: soft


Neurological exam: PRESENT: alert, CN II-XII grossly intact





Results


Laboratory Results: 


 





 07/15/17 03:54 





 07/15/17 03:54 





 











  07/14/17 07/14/17 07/15/17





  15:00 15:00 01:29


 


WBC  5.5  


 


RBC  3.36 L  


 


Hgb  8.8 L  


 


Hct  27.7 L  


 


MCV  82  


 


MCH  26.3 L  


 


MCHC  32.0  


 


RDW  15.4 H  


 


Plt Count  352  


 


Seg Neutrophils %   


 


Lymphocytes %   


 


Monocytes %   


 


Eosinophils %   


 


Basophils %   


 


Absolute Neutrophils   


 


Absolute Lymphocytes   


 


Absolute Monocytes   


 


Absolute Eosinophils   


 


Absolute Basophils   


 


Sodium   143.2 


 


Potassium   3.4 L 


 


Chloride   99 


 


Carbon Dioxide   33 H 


 


Anion Gap   11 


 


BUN   14 


 


Creatinine   0.91 


 


Est GFR ( Amer)   > 60 


 


Est GFR (Non-Af Amer)   > 60 


 


Glucose   105 


 


Calcium   10.3 H 


 


Phosphorus   


 


Magnesium   


 


Total Bilirubin   0.5 


 


AST   12 L 


 


ALT   22 


 


Alkaline Phosphatase   80 


 


Total Protein   7.1 


 


Albumin   4.1 


 


Urine Color    YELLOW


 


Urine Appearance    CLEAR


 


Urine pH    5.0


 


Ur Specific Gravity    1.016


 


Urine Protein    NEGATIVE


 


Urine Glucose (UA)    NEGATIVE


 


Urine Ketones    NEGATIVE


 


Urine Blood    SMALL H


 


Urine Nitrite    NEGATIVE


 


Ur Leukocyte Esterase    NEGATIVE


 


Urine WBC (Auto)    1


 


Urine RBC (Auto)    1


 


Blood Type   


 


Antibody Screen   














  07/15/17 07/15/17 07/15/17





  03:54 03:54 06:09


 


WBC  4.8  


 


RBC  2.60 L  


 


Hgb  6.8 L  


 


Hct  21.1 L  


 


MCV  81  


 


MCH  26.1 L  


 


MCHC  32.3  


 


RDW  15.6 H  


 


Plt Count  248  


 


Seg Neutrophils %  69.1  


 


Lymphocytes %  20.9  


 


Monocytes %  8.6  


 


Eosinophils %  1.1  


 


Basophils %  0.3  


 


Absolute Neutrophils  3.4  


 


Absolute Lymphocytes  1.0  


 


Absolute Monocytes  0.4  


 


Absolute Eosinophils  0.1  


 


Absolute Basophils  0.0  


 


Sodium   138.1 


 


Potassium   3.0 L* 


 


Chloride   100 


 


Carbon Dioxide   32 H 


 


Anion Gap   6 


 


BUN   12 


 


Creatinine   0.81 


 


Est GFR ( Amer)   > 60 


 


Est GFR (Non-Af Amer)   > 60 


 


Glucose   102 


 


Calcium   9.6 


 


Phosphorus   2.3 L 


 


Magnesium   1.6 


 


Total Bilirubin   


 


AST   


 


ALT   


 


Alkaline Phosphatase   


 


Total Protein   


 


Albumin   


 


Urine Color   


 


Urine Appearance   


 


Urine pH   


 


Ur Specific Gravity   


 


Urine Protein   


 


Urine Glucose (UA)   


 


Urine Ketones   


 


Urine Blood   


 


Urine Nitrite   


 


Ur Leukocyte Esterase   


 


Urine WBC (Auto)   


 


Urine RBC (Auto)   


 


Blood Type    O POSITIVE


 


Antibody Screen    NEGATIVE











Impressions: 


 





Chest CT  07/14/17 00:00


IMPRESSION:  CHRONIC BULLOUS EMPHYSEMA.  NO ACUTE FINDING ON NON-CONTRASTED 

CHEST CT.


 








Chest X-Ray  07/14/17 00:00


IMPRESSION:  COPD.  NO ACUTE RADIOGRAPHIC FINDING IN THE CHEST.


 














Assessment & Plan





- Diagnosis


(1) Hypotension


Qualifiers: 


     Hypotension type: unspecified hypotension type        Qualified Code(s): 

I95.9 - Hypotension, unspecified  


Is this a current diagnosis for this admission?: YesPlan: 


The blood pressure is improved with hydration








(2) Atrial fibrillation with rapid ventricular response


Is this a current diagnosis for this admission?: YesPlan: 


Presently rate controlled on medication, continue present medication and also 

anticoagulant with Pradaxa








(3) Undernutrition


Is this a current diagnosis for this admission?: Yes





(4) Chronic respiratory failure


Qualifiers: 


     Respiratory failure complication: hypercapnia        Qualified Code(s): 

J96.12 - Chronic respiratory failure with hypercapnia  


Is this a current diagnosis for this admission?: Yes





(5) End stage chronic obstructive pulmonary disease


Is this a current diagnosis for this admission?: Yes





(6) Anemia


Qualifiers: 


     Anemia type: unspecified type        Qualified Code(s): D64.9 - Anemia, 

unspecified  


Is this a current diagnosis for this admission?: YesPlan: 


He will be  transfused with 2 units of packed red blood cells

## 2017-07-16 LAB
ANION GAP SERPL CALC-SCNC: 7 MMOL/L (ref 5–19)
BASOPHILS # BLD AUTO: 0 10^3/UL (ref 0–0.2)
BASOPHILS NFR BLD AUTO: 0.3 % (ref 0–2)
BUN SERPL-MCNC: 12 MG/DL (ref 7–20)
CALCIUM: 9.3 MG/DL (ref 8.4–10.2)
CHLORIDE SERPL-SCNC: 104 MMOL/L (ref 98–107)
CO2 SERPL-SCNC: 28 MMOL/L (ref 22–30)
CREAT SERPL-MCNC: 0.88 MG/DL (ref 0.52–1.25)
EOSINOPHIL # BLD AUTO: 0.1 10^3/UL (ref 0–0.6)
EOSINOPHIL NFR BLD AUTO: 1.1 % (ref 0–6)
ERYTHROCYTE [DISTWIDTH] IN BLOOD BY AUTOMATED COUNT: 15.3 % (ref 11.5–14)
GLUCOSE SERPL-MCNC: 92 MG/DL (ref 75–110)
HCT VFR BLD CALC: 25 % (ref 37.9–51)
HGB BLD-MCNC: 8.2 G/DL (ref 13.5–17)
HGB HCT DIFFERENCE: -0.4
LYMPHOCYTES # BLD AUTO: 0.9 10^3/UL (ref 0.5–4.7)
LYMPHOCYTES NFR BLD AUTO: 13.7 % (ref 13–45)
MCH RBC QN AUTO: 27.3 PG (ref 27–33.4)
MCHC RBC AUTO-ENTMCNC: 32.8 G/DL (ref 32–36)
MCV RBC AUTO: 83 FL (ref 80–97)
MONOCYTES # BLD AUTO: 0.5 10^3/UL (ref 0.1–1.4)
MONOCYTES NFR BLD AUTO: 7.6 % (ref 3–13)
NEUTROPHILS # BLD AUTO: 5.3 10^3/UL (ref 1.7–8.2)
NEUTS SEG NFR BLD AUTO: 77.3 % (ref 42–78)
POTASSIUM SERPL-SCNC: 3.9 MMOL/L (ref 3.6–5)
RBC # BLD AUTO: 3 10^6/UL (ref 4.35–5.55)
SODIUM SERPL-SCNC: 138.5 MMOL/L (ref 137–145)
WBC # BLD AUTO: 6.9 10^3/UL (ref 4–10.5)

## 2017-07-16 RX ADMIN — DABIGATRAN ETEXILATE MESYLATE SCH MG: 150 CAPSULE ORAL at 22:01

## 2017-07-16 RX ADMIN — DABIGATRAN ETEXILATE MESYLATE SCH MG: 150 CAPSULE ORAL at 09:10

## 2017-07-16 RX ADMIN — SERTRALINE HYDROCHLORIDE SCH MG: 50 TABLET ORAL at 09:11

## 2017-07-16 RX ADMIN — HYDROCHLOROTHIAZIDE SCH MG: 25 TABLET ORAL at 09:11

## 2017-07-16 RX ADMIN — MONTELUKAST SODIUM SCH MG: 10 TABLET, FILM COATED ORAL at 22:01

## 2017-07-16 RX ADMIN — MEGESTROL ACETATE SCH MG: 20 TABLET ORAL at 15:10

## 2017-07-16 RX ADMIN — DILTIAZEM HYDROCHLORIDE SCH MG: 180 CAPSULE, EXTENDED RELEASE ORAL at 09:11

## 2017-07-16 RX ADMIN — SIMVASTATIN SCH MG: 40 TABLET, FILM COATED ORAL at 22:01

## 2017-07-16 RX ADMIN — LANSOPRAZOLE SCH MG: 30 TABLET, ORALLY DISINTEGRATING, DELAYED RELEASE ORAL at 09:10

## 2017-07-16 RX ADMIN — TAMSULOSIN HYDROCHLORIDE SCH MG: 0.4 CAPSULE ORAL at 22:00

## 2017-07-16 RX ADMIN — TIOTROPIUM BROMIDE SCH CAP: 18 CAPSULE ORAL; RESPIRATORY (INHALATION) at 09:13

## 2017-07-16 RX ADMIN — ROFLUMILAST SCH MCG: 500 TABLET ORAL at 09:13

## 2017-07-16 RX ADMIN — FLUTICASONE PROPIONATE AND SALMETEROL SCH INH: 50; 250 POWDER RESPIRATORY (INHALATION) at 17:22

## 2017-07-16 RX ADMIN — FINASTERIDE SCH MG: 5 TABLET, FILM COATED ORAL at 09:12

## 2017-07-16 RX ADMIN — SODIUM CHLORIDE PRN ML: 9 INJECTION, SOLUTION INTRAVENOUS at 02:43

## 2017-07-16 RX ADMIN — FLUTICASONE PROPIONATE AND SALMETEROL SCH INH: 50; 250 POWDER RESPIRATORY (INHALATION) at 05:17

## 2017-07-16 NOTE — PDOC PROGRESS REPORT
Subjective


Progress Note for:: 07/16/17


Subjective:: 


Patient was seen by the bedside he was transfused with 2 units of packed red 

blood cells posttransfusion hemoglobin was 8.9 but it dropped to 8.2 from today'

s lab work





Physical Exam


Vital Signs: 


 











Temp Pulse Resp BP Pulse Ox


 


 98.4 F   91   16   99/44 L  99 


 


 07/16/17 07:51  07/16/17 07:51  07/16/17 07:51  07/16/17 07:51  07/16/17 07:51








 Intake & Output











 07/15/17 07/16/17 07/17/17





 06:59 06:59 06:59


 


Intake Total 2659 4495 


 


Output Total 375 950 


 


Balance 2284 3545 


 


Weight 52.5 kg 123.2 kg 











General appearance: PRESENT: no acute distress


Eye exam: PRESENT: PERRLA


Respiratory exam: PRESENT: decreased breath sounds


Cardiovascular exam: PRESENT: +S1, +S2


GI/Abdominal exam: PRESENT: soft


Neurological exam: PRESENT: alert, CN II-XII grossly intact





Results


Laboratory Results: 


 





 07/16/17 04:02 





 07/16/17 04:02 





 











  07/15/17 07/15/17 07/16/17





  06:09 20:38 04:02


 


WBC   6.6  6.9


 


RBC   3.26 L  3.00 L


 


Hgb   8.9 L D  8.2 L


 


Hct   27.6 L  25.0 L


 


MCV   85  D  83


 


MCH   27.4  27.3


 


MCHC   32.4  32.8


 


RDW   15.2 H  15.3 H


 


Plt Count   225  216


 


Seg Neutrophils %    77.3


 


Lymphocytes %    13.7


 


Monocytes %    7.6


 


Eosinophils %    1.1


 


Basophils %    0.3


 


Absolute Neutrophils    5.3


 


Absolute Lymphocytes    0.9


 


Absolute Monocytes    0.5


 


Absolute Eosinophils    0.1


 


Absolute Basophils    0.0


 


Sodium   


 


Potassium   


 


Chloride   


 


Carbon Dioxide   


 


Anion Gap   


 


BUN   


 


Creatinine   


 


Est GFR ( Amer)   


 


Est GFR (Non-Af Amer)   


 


Glucose   


 


Calcium   


 


Blood Type  O POSITIVE  


 


Antibody Screen  NEGATIVE  














  07/16/17





  04:02


 


WBC 


 


RBC 


 


Hgb 


 


Hct 


 


MCV 


 


MCH 


 


MCHC 


 


RDW 


 


Plt Count 


 


Seg Neutrophils % 


 


Lymphocytes % 


 


Monocytes % 


 


Eosinophils % 


 


Basophils % 


 


Absolute Neutrophils 


 


Absolute Lymphocytes 


 


Absolute Monocytes 


 


Absolute Eosinophils 


 


Absolute Basophils 


 


Sodium  138.5


 


Potassium  3.9


 


Chloride  104


 


Carbon Dioxide  28


 


Anion Gap  7


 


BUN  12


 


Creatinine  0.88


 


Est GFR ( Amer)  > 60


 


Est GFR (Non-Af Amer)  > 60


 


Glucose  92


 


Calcium  9.3


 


Blood Type 


 


Antibody Screen 











Impressions: 


 





Chest CT  07/14/17 00:00


IMPRESSION:  CHRONIC BULLOUS EMPHYSEMA.  NO ACUTE FINDING ON NON-CONTRASTED 

CHEST CT.


 








Chest X-Ray  07/14/17 00:00


IMPRESSION:  COPD.  NO ACUTE RADIOGRAPHIC FINDING IN THE CHEST.


 














Assessment & Plan





- Diagnosis


(1) Hypotension


Qualifiers: 


     Hypotension type: unspecified hypotension type        Qualified Code(s): 

I95.9 - Hypotension, unspecified  


Is this a current diagnosis for this admission?: Yes





(2) Atrial fibrillation with rapid ventricular response


Is this a current diagnosis for this admission?: Yes





(3) Undernutrition


Is this a current diagnosis for this admission?: Yes





(4) Chronic respiratory failure


Qualifiers: 


     Respiratory failure complication: hypercapnia        Qualified Code(s): 

J96.12 - Chronic respiratory failure with hypercapnia  


Is this a current diagnosis for this admission?: Yes





(5) End stage chronic obstructive pulmonary disease


Is this a current diagnosis for this admission?: Yes





(6) Anemia


Qualifiers: 


     Anemia type: unspecified type        Qualified Code(s): D64.9 - Anemia, 

unspecified  


Is this a current diagnosis for this admission?: Yes








- Plan Summary


Plan Summary: 


He will continue present treatment

## 2017-07-17 LAB
ANION GAP SERPL CALC-SCNC: 5 MMOL/L (ref 5–19)
BASOPHILS # BLD AUTO: 0 10^3/UL (ref 0–0.2)
BASOPHILS NFR BLD AUTO: 0.3 % (ref 0–2)
BUN SERPL-MCNC: 12 MG/DL (ref 7–20)
CALCIUM: 9.3 MG/DL (ref 8.4–10.2)
CHLORIDE SERPL-SCNC: 103 MMOL/L (ref 98–107)
CO2 SERPL-SCNC: 30 MMOL/L (ref 22–30)
CREAT SERPL-MCNC: 0.71 MG/DL (ref 0.52–1.25)
EOSINOPHIL # BLD AUTO: 0.1 10^3/UL (ref 0–0.6)
EOSINOPHIL NFR BLD AUTO: 1.8 % (ref 0–6)
ERYTHROCYTE [DISTWIDTH] IN BLOOD BY AUTOMATED COUNT: 15.6 % (ref 11.5–14)
GLUCOSE SERPL-MCNC: 83 MG/DL (ref 75–110)
HCT VFR BLD CALC: 26.9 % (ref 37.9–51)
HGB BLD-MCNC: 8.7 G/DL (ref 13.5–17)
HGB HCT DIFFERENCE: -0.8
LYMPHOCYTES # BLD AUTO: 0.9 10^3/UL (ref 0.5–4.7)
LYMPHOCYTES NFR BLD AUTO: 16.8 % (ref 13–45)
MCH RBC QN AUTO: 27.1 PG (ref 27–33.4)
MCHC RBC AUTO-ENTMCNC: 32.2 G/DL (ref 32–36)
MCV RBC AUTO: 84 FL (ref 80–97)
MONOCYTES # BLD AUTO: 0.4 10^3/UL (ref 0.1–1.4)
MONOCYTES NFR BLD AUTO: 7.5 % (ref 3–13)
NEUTROPHILS # BLD AUTO: 4 10^3/UL (ref 1.7–8.2)
NEUTS SEG NFR BLD AUTO: 73.6 % (ref 42–78)
POTASSIUM SERPL-SCNC: 3.5 MMOL/L (ref 3.6–5)
RBC # BLD AUTO: 3.2 10^6/UL (ref 4.35–5.55)
SODIUM SERPL-SCNC: 138.2 MMOL/L (ref 137–145)
WBC # BLD AUTO: 5.4 10^3/UL (ref 4–10.5)

## 2017-07-17 RX ADMIN — FINASTERIDE SCH MG: 5 TABLET, FILM COATED ORAL at 09:11

## 2017-07-17 RX ADMIN — FLUTICASONE PROPIONATE AND SALMETEROL SCH INH: 50; 250 POWDER RESPIRATORY (INHALATION) at 17:33

## 2017-07-17 RX ADMIN — FLUTICASONE PROPIONATE AND SALMETEROL SCH INH: 50; 250 POWDER RESPIRATORY (INHALATION) at 06:05

## 2017-07-17 RX ADMIN — DABIGATRAN ETEXILATE MESYLATE SCH MG: 150 CAPSULE ORAL at 21:35

## 2017-07-17 RX ADMIN — TIOTROPIUM BROMIDE SCH CAP: 18 CAPSULE ORAL; RESPIRATORY (INHALATION) at 10:11

## 2017-07-17 RX ADMIN — DABIGATRAN ETEXILATE MESYLATE SCH MG: 150 CAPSULE ORAL at 09:11

## 2017-07-17 RX ADMIN — SERTRALINE HYDROCHLORIDE SCH MG: 50 TABLET ORAL at 09:10

## 2017-07-17 RX ADMIN — DILTIAZEM HYDROCHLORIDE SCH MG: 180 CAPSULE, EXTENDED RELEASE ORAL at 09:10

## 2017-07-17 RX ADMIN — SODIUM CHLORIDE PRN ML: 9 INJECTION, SOLUTION INTRAVENOUS at 17:33

## 2017-07-17 RX ADMIN — TAMSULOSIN HYDROCHLORIDE SCH MG: 0.4 CAPSULE ORAL at 21:35

## 2017-07-17 RX ADMIN — SODIUM CHLORIDE PRN ML: 9 INJECTION, SOLUTION INTRAVENOUS at 06:05

## 2017-07-17 RX ADMIN — LANSOPRAZOLE SCH MG: 30 TABLET, ORALLY DISINTEGRATING, DELAYED RELEASE ORAL at 09:11

## 2017-07-17 RX ADMIN — SIMVASTATIN SCH MG: 40 TABLET, FILM COATED ORAL at 21:35

## 2017-07-17 RX ADMIN — HYDROCHLOROTHIAZIDE SCH MG: 25 TABLET ORAL at 09:10

## 2017-07-17 RX ADMIN — MEGESTROL ACETATE SCH MG: 20 TABLET ORAL at 09:15

## 2017-07-17 RX ADMIN — ROFLUMILAST SCH MCG: 500 TABLET ORAL at 09:14

## 2017-07-17 RX ADMIN — MONTELUKAST SODIUM SCH MG: 10 TABLET, FILM COATED ORAL at 21:35

## 2017-07-17 NOTE — PDOC PROGRESS REPORT
Subjective


Progress Note for:: 07/17/17


Subjective:: 


Patient was seen by discharge planning, he stated that he is not particularly 

interested in going to a facility at this stage of his life he will like to 

return back home.  Patient  family/daughter stated that patient needed to stay 

in a nursing home but patient disagrees, since  he is alert and oriented and he 

can make decision for himself we have to follow his  wishes at this point.  The 

blood culture result was positive for gram positive cocci in clusters in 1 

bottle this is most likely contamination ,it is probably staph epi, patient is 

not showing signs of sepsis.





Physical Exam


Vital Signs: 


 











Temp Pulse Resp BP Pulse Ox


 


 98.1 F   102 H  32 H  126/59 H  100 


 


 07/17/17 12:00  07/17/17 14:00  07/17/17 12:00  07/17/17 12:00  07/17/17 12:00








 Intake & Output











 07/16/17 07/17/17 07/18/17





 06:59 06:59 06:59


 


Intake Total 4495 3225 1418


 


Output Total 950 1950 600


 


Balance 3545 1275 818


 


Weight 123.2 kg 56.1 kg 











General appearance: PRESENT: no acute distress


Eye exam: PRESENT: PERRLA


Respiratory exam: PRESENT: decreased breath sounds


Cardiovascular exam: PRESENT: +S1, +S2


Neurological exam: PRESENT: alert, CN II-XII grossly intact





Results


Laboratory Results: 


 





 07/17/17 04:09 





 07/17/17 04:09 





 











  07/17/17 07/17/17





  04:09 04:09


 


WBC  5.4 


 


RBC  3.20 L 


 


Hgb  8.7 L 


 


Hct  26.9 L 


 


MCV  84 


 


MCH  27.1 


 


MCHC  32.2 


 


RDW  15.6 H 


 


Plt Count  209 


 


Seg Neutrophils %  73.6 


 


Lymphocytes %  16.8 


 


Monocytes %  7.5 


 


Eosinophils %  1.8 


 


Basophils %  0.3 


 


Absolute Neutrophils  4.0 


 


Absolute Lymphocytes  0.9 


 


Absolute Monocytes  0.4 


 


Absolute Eosinophils  0.1 


 


Absolute Basophils  0.0 


 


Sodium   138.2


 


Potassium   3.5 L


 


Chloride   103


 


Carbon Dioxide   30


 


Anion Gap   5


 


BUN   12


 


Creatinine   0.71


 


Est GFR ( Amer)   > 60


 


Est GFR (Non-Af Amer)   > 60


 


Glucose   83


 


Calcium   9.3











Impressions: 


 





Chest CT  07/14/17 00:00


IMPRESSION:  CHRONIC BULLOUS EMPHYSEMA.  NO ACUTE FINDING ON NON-CONTRASTED 

CHEST CT.


 








Chest X-Ray  07/14/17 00:00


IMPRESSION:  COPD.  NO ACUTE RADIOGRAPHIC FINDING IN THE CHEST.


 














Assessment & Plan





- Diagnosis


(1) Hypotension


Qualifiers: 


     Hypotension type: unspecified hypotension type        Qualified Code(s): 

I95.9 - Hypotension, unspecified  


Is this a current diagnosis for this admission?: Yes





(2) Atrial fibrillation with rapid ventricular response


Is this a current diagnosis for this admission?: Yes





(3) Undernutrition


Is this a current diagnosis for this admission?: Yes





(4) Chronic respiratory failure


Qualifiers: 


     Respiratory failure complication: hypercapnia        Qualified Code(s): 

J96.12 - Chronic respiratory failure with hypercapnia  


Is this a current diagnosis for this admission?: Yes





(5) End stage chronic obstructive pulmonary disease


Is this a current diagnosis for this admission?: Yes





(6) Anemia


Qualifiers: 


     Anemia type: unspecified type        Qualified Code(s): D64.9 - Anemia, 

unspecified  


Is this a current diagnosis for this admission?: Yes





(7) Hypokalemia


Is this a current diagnosis for this admission?: YesPlan: 


correct K

## 2017-07-18 LAB
ALBUMIN SERPL-MCNC: 2.7 G/DL (ref 3.5–5)
ALP SERPL-CCNC: 55 U/L (ref 38–126)
ALT SERPL-CCNC: 25 U/L (ref 21–72)
ANION GAP SERPL CALC-SCNC: 5 MMOL/L (ref 5–19)
AST SERPL-CCNC: 15 U/L (ref 17–59)
BASOPHILS # BLD AUTO: 0 10^3/UL (ref 0–0.2)
BASOPHILS NFR BLD AUTO: 0.3 % (ref 0–2)
BILIRUB DIRECT SERPL-MCNC: 0.3 MG/DL (ref 0–0.4)
BILIRUB SERPL-MCNC: 0.4 MG/DL (ref 0.2–1.3)
BUN SERPL-MCNC: 12 MG/DL (ref 7–20)
CALCIUM: 9.3 MG/DL (ref 8.4–10.2)
CHLORIDE SERPL-SCNC: 101 MMOL/L (ref 98–107)
CO2 SERPL-SCNC: 34 MMOL/L (ref 22–30)
CREAT SERPL-MCNC: 1.06 MG/DL (ref 0.52–1.25)
EOSINOPHIL # BLD AUTO: 0.1 10^3/UL (ref 0–0.6)
EOSINOPHIL NFR BLD AUTO: 1.9 % (ref 0–6)
ERYTHROCYTE [DISTWIDTH] IN BLOOD BY AUTOMATED COUNT: 16.7 % (ref 11.5–14)
GLUCOSE SERPL-MCNC: 95 MG/DL (ref 75–110)
HCT VFR BLD CALC: 27.8 % (ref 37.9–51)
HGB BLD-MCNC: 8.7 G/DL (ref 13.5–17)
HGB HCT DIFFERENCE: -1.7
LYMPHOCYTES # BLD AUTO: 1 10^3/UL (ref 0.5–4.7)
LYMPHOCYTES NFR BLD AUTO: 17.9 % (ref 13–45)
MCH RBC QN AUTO: 27 PG (ref 27–33.4)
MCHC RBC AUTO-ENTMCNC: 31.4 G/DL (ref 32–36)
MCV RBC AUTO: 86 FL (ref 80–97)
MONOCYTES # BLD AUTO: 0.4 10^3/UL (ref 0.1–1.4)
MONOCYTES NFR BLD AUTO: 6.3 % (ref 3–13)
NEUTROPHILS # BLD AUTO: 4.2 10^3/UL (ref 1.7–8.2)
NEUTS SEG NFR BLD AUTO: 73.6 % (ref 42–78)
POTASSIUM SERPL-SCNC: 4.3 MMOL/L (ref 3.6–5)
PROT SERPL-MCNC: 5.1 G/DL (ref 6.3–8.2)
RBC # BLD AUTO: 3.24 10^6/UL (ref 4.35–5.55)
SODIUM SERPL-SCNC: 139.6 MMOL/L (ref 137–145)
WBC # BLD AUTO: 5.6 10^3/UL (ref 4–10.5)

## 2017-07-18 RX ADMIN — FINASTERIDE SCH MG: 5 TABLET, FILM COATED ORAL at 09:21

## 2017-07-18 RX ADMIN — DILTIAZEM HYDROCHLORIDE SCH MG: 180 CAPSULE, EXTENDED RELEASE ORAL at 09:21

## 2017-07-18 RX ADMIN — SIMVASTATIN SCH MG: 40 TABLET, FILM COATED ORAL at 21:19

## 2017-07-18 RX ADMIN — DABIGATRAN ETEXILATE MESYLATE SCH MG: 150 CAPSULE ORAL at 09:22

## 2017-07-18 RX ADMIN — HYDROCHLOROTHIAZIDE SCH MG: 25 TABLET ORAL at 09:26

## 2017-07-18 RX ADMIN — LANSOPRAZOLE SCH MG: 30 TABLET, ORALLY DISINTEGRATING, DELAYED RELEASE ORAL at 09:22

## 2017-07-18 RX ADMIN — POTASSIUM CHLORIDE SCH MEQ: 1.5 SOLUTION ORAL at 09:26

## 2017-07-18 RX ADMIN — SERTRALINE HYDROCHLORIDE SCH MG: 50 TABLET ORAL at 09:26

## 2017-07-18 RX ADMIN — SODIUM CHLORIDE PRN ML: 9 INJECTION, SOLUTION INTRAVENOUS at 05:11

## 2017-07-18 RX ADMIN — FLUTICASONE PROPIONATE AND SALMETEROL SCH INH: 50; 250 POWDER RESPIRATORY (INHALATION) at 18:06

## 2017-07-18 RX ADMIN — MEGESTROL ACETATE SCH MG: 20 TABLET ORAL at 09:23

## 2017-07-18 RX ADMIN — MONTELUKAST SODIUM SCH MG: 10 TABLET, FILM COATED ORAL at 21:19

## 2017-07-18 RX ADMIN — SODIUM CHLORIDE PRN ML: 9 INJECTION, SOLUTION INTRAVENOUS at 18:07

## 2017-07-18 RX ADMIN — TIOTROPIUM BROMIDE SCH CAP: 18 CAPSULE ORAL; RESPIRATORY (INHALATION) at 09:24

## 2017-07-18 RX ADMIN — DABIGATRAN ETEXILATE MESYLATE SCH MG: 150 CAPSULE ORAL at 21:19

## 2017-07-18 RX ADMIN — TAMSULOSIN HYDROCHLORIDE SCH MG: 0.4 CAPSULE ORAL at 21:19

## 2017-07-18 RX ADMIN — ROFLUMILAST SCH MCG: 500 TABLET ORAL at 09:24

## 2017-07-18 RX ADMIN — FLUTICASONE PROPIONATE AND SALMETEROL SCH INH: 50; 250 POWDER RESPIRATORY (INHALATION) at 05:10

## 2017-07-18 NOTE — EKG REPORT
SEVERITY:- ABNORMAL ECG -

SINUS RHYTHM

ANTERIOR INFARCT, AGE INDETERMINATE

:

Confirmed by: Franco Lazar 18-Jul-2017 19:51:56

## 2017-07-18 NOTE — PDOC PROGRESS REPORT
Subjective


Progress Note for:: 07/18/17


Subjective:: 


Patient was seen by the bedside, I discussed with him if he be interested in 

going to nursing home for rehabilitation.





Physical Exam


Vital Signs: 


 











Temp Pulse Resp BP Pulse Ox


 


 99.8 F   93   18   122/64   100 


 


 07/18/17 16:34  07/18/17 19:00  07/18/17 16:34  07/18/17 16:34  07/18/17 16:34








 Intake & Output











 07/17/17 07/18/17 07/19/17





 06:59 06:59 06:59


 


Intake Total 3225 2960 3100


 


Output Total 1950 1500 1800


 


Balance 1275 1460 1300


 


Weight 56.1 kg 57.2 kg 











General appearance: PRESENT: no acute distress


Eye exam: PRESENT: PERRLA


Respiratory exam: PRESENT: decreased breath sounds


Cardiovascular exam: PRESENT: +S1, +S2


GI/Abdominal exam: PRESENT: soft


Neurological exam: PRESENT: alert





Results


Laboratory Results: 


 





 07/17/17 04:09 





 07/17/17 04:09 





 





07/14/17 15:00   Blood   Blood Culture - Final


                            Staphylococcus Hominis








Impressions: 


 





Chest CT  07/14/17 00:00


IMPRESSION:  CHRONIC BULLOUS EMPHYSEMA.  NO ACUTE FINDING ON NON-CONTRASTED 

CHEST CT.


 








Chest X-Ray  07/14/17 00:00


IMPRESSION:  COPD.  NO ACUTE RADIOGRAPHIC FINDING IN THE CHEST.


 














Assessment & Plan





- Diagnosis


(1) Hypotension


Qualifiers: 


     Hypotension type: unspecified hypotension type        Qualified Code(s): 

I95.9 - Hypotension, unspecified  


Is this a current diagnosis for this admission?: Yes





(2) Atrial fibrillation with rapid ventricular response


Is this a current diagnosis for this admission?: Yes





(3) Undernutrition


Is this a current diagnosis for this admission?: Yes





(4) Chronic respiratory failure


Qualifiers: 


     Respiratory failure complication: hypercapnia        Qualified Code(s): 

J96.12 - Chronic respiratory failure with hypercapnia  


Is this a current diagnosis for this admission?: Yes





(5) End stage chronic obstructive pulmonary disease


Is this a current diagnosis for this admission?: Yes





(6) Anemia


Qualifiers: 


     Anemia type: unspecified type        Qualified Code(s): D64.9 - Anemia, 

unspecified  


Is this a current diagnosis for this admission?: Yes





(7) Hypokalemia


Is this a current diagnosis for this admission?: Yes

## 2017-07-19 VITALS — SYSTOLIC BLOOD PRESSURE: 101 MMHG | DIASTOLIC BLOOD PRESSURE: 45 MMHG

## 2017-07-19 LAB
ALBUMIN SERPL-MCNC: 2.5 G/DL (ref 3.5–5)
ALP SERPL-CCNC: 50 U/L (ref 38–126)
ALT SERPL-CCNC: 24 U/L (ref 21–72)
ANION GAP SERPL CALC-SCNC: 3 MMOL/L (ref 5–19)
AST SERPL-CCNC: 14 U/L (ref 17–59)
BASOPHILS # BLD AUTO: 0 10^3/UL (ref 0–0.2)
BASOPHILS NFR BLD AUTO: 0.3 % (ref 0–2)
BILIRUB DIRECT SERPL-MCNC: 0.3 MG/DL (ref 0–0.4)
BILIRUB SERPL-MCNC: 0.4 MG/DL (ref 0.2–1.3)
BUN SERPL-MCNC: 12 MG/DL (ref 7–20)
CALCIUM: 9.2 MG/DL (ref 8.4–10.2)
CHLORIDE SERPL-SCNC: 102 MMOL/L (ref 98–107)
CO2 SERPL-SCNC: 33 MMOL/L (ref 22–30)
CREAT SERPL-MCNC: 0.74 MG/DL (ref 0.52–1.25)
EOSINOPHIL # BLD AUTO: 0.1 10^3/UL (ref 0–0.6)
EOSINOPHIL NFR BLD AUTO: 1.8 % (ref 0–6)
ERYTHROCYTE [DISTWIDTH] IN BLOOD BY AUTOMATED COUNT: 16.9 % (ref 11.5–14)
GLUCOSE SERPL-MCNC: 87 MG/DL (ref 75–110)
HCT VFR BLD CALC: 25.2 % (ref 37.9–51)
HGB BLD-MCNC: 8.2 G/DL (ref 13.5–17)
HGB HCT DIFFERENCE: -0.6
LYMPHOCYTES # BLD AUTO: 0.9 10^3/UL (ref 0.5–4.7)
LYMPHOCYTES NFR BLD AUTO: 20.3 % (ref 13–45)
MCH RBC QN AUTO: 27.6 PG (ref 27–33.4)
MCHC RBC AUTO-ENTMCNC: 32.4 G/DL (ref 32–36)
MCV RBC AUTO: 85 FL (ref 80–97)
MONOCYTES # BLD AUTO: 0.3 10^3/UL (ref 0.1–1.4)
MONOCYTES NFR BLD AUTO: 7.4 % (ref 3–13)
NEUTROPHILS # BLD AUTO: 3.3 10^3/UL (ref 1.7–8.2)
NEUTS SEG NFR BLD AUTO: 70.2 % (ref 42–78)
POTASSIUM SERPL-SCNC: 4.2 MMOL/L (ref 3.6–5)
PROT SERPL-MCNC: 4.7 G/DL (ref 6.3–8.2)
RBC # BLD AUTO: 2.96 10^6/UL (ref 4.35–5.55)
SODIUM SERPL-SCNC: 138.1 MMOL/L (ref 137–145)
WBC # BLD AUTO: 4.6 10^3/UL (ref 4–10.5)

## 2017-07-19 RX ADMIN — MEGESTROL ACETATE SCH MG: 20 TABLET ORAL at 10:19

## 2017-07-19 RX ADMIN — SODIUM CHLORIDE PRN ML: 9 INJECTION, SOLUTION INTRAVENOUS at 05:09

## 2017-07-19 RX ADMIN — FLUTICASONE PROPIONATE AND SALMETEROL SCH INH: 50; 250 POWDER RESPIRATORY (INHALATION) at 17:36

## 2017-07-19 RX ADMIN — SERTRALINE HYDROCHLORIDE SCH MG: 50 TABLET ORAL at 10:18

## 2017-07-19 RX ADMIN — DABIGATRAN ETEXILATE MESYLATE SCH MG: 150 CAPSULE ORAL at 10:21

## 2017-07-19 RX ADMIN — HYDROCHLOROTHIAZIDE SCH MG: 25 TABLET ORAL at 10:25

## 2017-07-19 RX ADMIN — DILTIAZEM HYDROCHLORIDE SCH MG: 180 CAPSULE, EXTENDED RELEASE ORAL at 10:17

## 2017-07-19 RX ADMIN — POTASSIUM CHLORIDE SCH MEQ: 1.5 SOLUTION ORAL at 10:17

## 2017-07-19 RX ADMIN — FLUTICASONE PROPIONATE AND SALMETEROL SCH INH: 50; 250 POWDER RESPIRATORY (INHALATION) at 05:08

## 2017-07-19 RX ADMIN — LANSOPRAZOLE SCH MG: 30 TABLET, ORALLY DISINTEGRATING, DELAYED RELEASE ORAL at 10:18

## 2017-07-19 RX ADMIN — ROFLUMILAST SCH MCG: 500 TABLET ORAL at 10:18

## 2017-07-19 RX ADMIN — FINASTERIDE SCH MG: 5 TABLET, FILM COATED ORAL at 10:18

## 2017-07-19 RX ADMIN — TIOTROPIUM BROMIDE SCH CAP: 18 CAPSULE ORAL; RESPIRATORY (INHALATION) at 10:19

## 2017-07-19 NOTE — PDOC TRANSFER SUMMARY
General





- Admit/Disc Date/PCP


Admission Date/Primary Care Provider: 


  07/14/17 16:21





  MIKE MCCULLOUGH MD





Discharge Date: 07/19/17





- Discharge Diagnosis


(1) Hypotension


Is this a current diagnosis for this admission?: Yes





(2) Atrial fibrillation with rapid ventricular response


Is this a current diagnosis for this admission?: Yes





(3) Undernutrition


Is this a current diagnosis for this admission?: Yes





(4) Chronic respiratory failure


Is this a current diagnosis for this admission?: Yes





(5) End stage chronic obstructive pulmonary disease


Is this a current diagnosis for this admission?: Yes





(6) Anemia


Is this a current diagnosis for this admission?: Yes





(7) Hypokalemia


Is this a current diagnosis for this admission?: Yes








- Additional Information


Home Medications: 








Dabigatran Etexilate Mesylate [Pradaxa 150 mg Capsule] 150 mg PO Q12 07/14/17 


Diltiazem HCl [Diltiazem 24Hr ER] 180 mg PO DAILY 07/14/17 


Esomeprazole Mag Trihydrate [Nexium] 40 mg PO DAILY 07/14/17 


Finasteride [Proscar 5 mg Tablet] 5 mg PO DAILY 07/14/17 


Fluticasone/Salmeterol [Advair 250-50 Diskus 28 dose] 1 puff IH Q12 07/14/17 


Hydrochlorothiazide 25 mg PO DAILY 07/14/17 


Megestrol Acetate 40 mg PO DAILY 07/14/17 


Montelukast Sodium [Singulair 10 mg Tablet] 10 mg PO QPM 07/14/17 


Roflumilast [Daliresp 500 mcg Tablet] 500 mcg PO DAILY 07/14/17 


Sertraline HCl [Zoloft 50 mg Tablet] 50 mg PO DAILY 07/14/17 


Simvastatin 40 mg PO QPM 07/14/17 


Tamsulosin HCl [Flomax 0.4 mg Cap.sr] 0.8 mg PO QHS 07/14/17 


Tiotropium Bromide [Spiriva Handihaler 18 mcg/dose (30 Dose)] 1 cap IH DAILY 07/ 14/17 











History of Present Illness


Admission Date/PCP: 


  07/14/17 16:21





  MIKE MCCULLOUGH MD





History of Present Illness: 


PEDRO REYES is a 76 year old male, he came to the office with his family in 

the wheelchair for evaluation of progressive weight loss, complete lack of 

energy, low blood pressure, shortness of breath on mild exertion.  He has 

multiple comorbid conditions including chronic obstructive lung disease, 

chronic respiratory failure on home oxygen, chronic atrial fibrillation on 

anticoagulant.  In the office he was evaluated the blood pressure was low ,80 

systolic ,with a pulse of 140.  He was admitted directly from the office 

because of concern for hypotension in the setting of atrial fibrillation with 

rapid ventricular response. The initial hemogram showed hemoglobin 8, the body 

mass index is 14.5.  CT chest without contrast was done showed hyperinflation 

with marked chronic bullous emphysema.





Hospital Course


Hospital Course: 


Patient was admitted for evaluation of low blood pressure, anemia, generalized 

debilitation, was treated with IV fluid, he also had blood transfusion with 2 

units of packed red blood cells.  Patient is extremely deconditioned, the plan 

is to transfer to nursing home for rehabilitation.  He has multiple comorbid 

conditions including chronic atrial fibrillation on chronic anticoagulation 

with Pradaxa, chronic respiratory on admission he had atrial fibrillation with 

rapid ventricular response thought to be due to dehydration, rate control is 

achieved with calcium channel Blocker on IV fluid therapy.





Physical Exam


Vital Signs: 


 











Temp Pulse Resp BP Pulse Ox


 


 99.3 F   114 H  16   113/49 L  100 


 


 07/19/17 11:39  07/19/17 11:39  07/19/17 11:39  07/19/17 11:39  07/19/17 11:39








 Intake & Output











 07/18/17 07/19/17 07/20/17





 06:59 06:59 06:59


 


Intake Total 2960 5050 375


 


Output Total 1500 3500 500


 


Balance 1460 1550 -125


 


Weight 57.2 kg 57.2 kg 











General appearance: PRESENT: no acute distress


Eye exam: PRESENT: PERRLA


Respiratory exam: PRESENT: decreased breath sounds


Cardiovascular exam: PRESENT: +S1, +S2


GI/Abdominal exam: PRESENT: soft


Neurological exam: PRESENT: alert, CN II-XII grossly intact





Results


Laboratory Results: 


 





 07/19/17 04:07 





 07/19/17 04:07 





 











  07/18/17 07/18/17 07/19/17





  20:10 20:10 04:07


 


WBC  5.6   4.6


 


RBC  3.24 L   2.96 L


 


Hgb  8.7 L   8.2 L


 


Hct  27.8 L   25.2 L


 


MCV  86   85


 


MCH  27.0   27.6


 


MCHC  31.4 L   32.4


 


RDW  16.7 H   16.9 H


 


Plt Count  207   187


 


Seg Neutrophils %  73.6   70.2


 


Lymphocytes %  17.9   20.3


 


Monocytes %  6.3   7.4


 


Eosinophils %  1.9   1.8


 


Basophils %  0.3   0.3


 


Absolute Neutrophils  4.2   3.3


 


Absolute Lymphocytes  1.0   0.9


 


Absolute Monocytes  0.4   0.3


 


Absolute Eosinophils  0.1   0.1


 


Absolute Basophils  0.0   0.0


 


Sodium   139.6 


 


Potassium   4.3 


 


Chloride   101 


 


Carbon Dioxide   34 H 


 


Anion Gap   5 


 


BUN   12 


 


Creatinine   1.06 


 


Est GFR ( Amer)   > 60 


 


Est GFR (Non-Af Amer)   > 60 


 


Glucose   95 


 


Calcium   9.3 


 


Total Bilirubin   0.4 


 


AST   15 L 


 


ALT   25 


 


Alkaline Phosphatase   55 


 


Total Protein   5.1 L 


 


Albumin   2.7 L 














  07/19/17





  04:07


 


WBC 


 


RBC 


 


Hgb 


 


Hct 


 


MCV 


 


MCH 


 


MCHC 


 


RDW 


 


Plt Count 


 


Seg Neutrophils % 


 


Lymphocytes % 


 


Monocytes % 


 


Eosinophils % 


 


Basophils % 


 


Absolute Neutrophils 


 


Absolute Lymphocytes 


 


Absolute Monocytes 


 


Absolute Eosinophils 


 


Absolute Basophils 


 


Sodium  138.1


 


Potassium  4.2


 


Chloride  102


 


Carbon Dioxide  33 H


 


Anion Gap  3 L


 


BUN  12


 


Creatinine  0.74


 


Est GFR ( Amer)  > 60


 


Est GFR (Non-Af Amer)  > 60


 


Glucose  87


 


Calcium  9.2


 


Total Bilirubin  0.4


 


AST  14 L


 


ALT  24


 


Alkaline Phosphatase  50


 


Total Protein  4.7 L


 


Albumin  2.5 L








 





07/14/17 15:00   Blood   Blood Culture - Final


                            Staphylococcus Hominis








Impressions: 


 





Chest CT  07/14/17 00:00


IMPRESSION:  CHRONIC BULLOUS EMPHYSEMA.  NO ACUTE FINDING ON NON-CONTRASTED 

CHEST CT.


 








Chest X-Ray  07/14/17 00:00


IMPRESSION:  COPD.  NO ACUTE RADIOGRAPHIC FINDING IN THE CHEST.